# Patient Record
Sex: FEMALE | Race: WHITE | Employment: FULL TIME | URBAN - METROPOLITAN AREA
[De-identification: names, ages, dates, MRNs, and addresses within clinical notes are randomized per-mention and may not be internally consistent; named-entity substitution may affect disease eponyms.]

---

## 2018-02-08 ENCOUNTER — OFFICE VISIT (OUTPATIENT)
Dept: SURGERY | Age: 58
End: 2018-02-08

## 2018-02-08 ENCOUNTER — HOSPITAL ENCOUNTER (OUTPATIENT)
Dept: LAB | Age: 58
Discharge: HOME | End: 2018-02-08
Payer: COMMERCIAL

## 2018-02-08 VITALS
DIASTOLIC BLOOD PRESSURE: 84 MMHG | TEMPERATURE: 98.2 F | HEIGHT: 60 IN | RESPIRATION RATE: 17 BRPM | OXYGEN SATURATION: 96 % | WEIGHT: 192 LBS | HEART RATE: 78 BPM | SYSTOLIC BLOOD PRESSURE: 132 MMHG | BODY MASS INDEX: 37.69 KG/M2

## 2018-02-08 DIAGNOSIS — C18.7 MALIGNANT NEOPLASM OF SIGMOID COLON (HCC): ICD-10-CM

## 2018-02-08 DIAGNOSIS — C18.7 MALIGNANT NEOPLASM OF SIGMOID COLON (HCC): Primary | ICD-10-CM

## 2018-02-08 PROCEDURE — 93005 ELECTROCARDIOGRAM TRACING: CPT

## 2018-02-08 RX ORDER — METRONIDAZOLE 500 MG/1
500 TABLET ORAL 3 TIMES DAILY
Qty: 3 TAB | Refills: 0 | Status: SHIPPED | OUTPATIENT
Start: 2018-02-08 | End: 2018-02-09

## 2018-02-08 RX ORDER — NEOMYCIN SULFATE 500 MG/1
1000 TABLET ORAL 3 TIMES DAILY
Qty: 6 TAB | Refills: 0 | Status: SHIPPED | OUTPATIENT
Start: 2018-02-08 | End: 2018-02-09

## 2018-02-08 RX ORDER — DICLOFENAC SODIUM 75 MG/1
75 TABLET, DELAYED RELEASE ORAL AS NEEDED
COMMUNITY
Start: 2017-11-16

## 2018-02-08 RX ORDER — ROSUVASTATIN CALCIUM 10 MG/1
10 TABLET, COATED ORAL
COMMUNITY
Start: 2017-11-03

## 2018-02-08 RX ORDER — LANOLIN ALCOHOL/MO/W.PET/CERES
500 CREAM (GRAM) TOPICAL DAILY
COMMUNITY

## 2018-02-08 RX ORDER — GLUCOSAMINE SULFATE 1500 MG
2000 POWDER IN PACKET (EA) ORAL DAILY
COMMUNITY
End: 2018-02-23

## 2018-02-08 NOTE — MR AVS SNAPSHOT
[facility] [address] [facility] 
[phone] Patient: Robert Mendez MRN: [id number] Visit Information Date & Time Provider Department Dept. Phone Encounter #  
 2/8/2018  8:45 AM Aron Landon MD ROXANA RUSSELL [facility] [phone] [id number] Upcoming Health Maintenance Date Due Hepatitis C Screening 1960 DTaP/Tdap/Td series (1 - Tdap) 5/12/1980 PAP AKA CERVICAL CYTOLOGY 5/12/1980 BREAST CANCER SCRN MAMMOGRAM 5/12/2009 FOBT Q 1 YEAR AGE 50-75 5/12/2009 Influenza Age 5 to Adult 8/1/2017 Allergies as of 2/8/2018  Review Complete On: 2/8/2018 By: Aron Landon MD  
 No Known Allergies Current Immunizations  Never Reviewed No immunizations on file. Not reviewed this visit You Were Diagnosed With   
  
 Codes Comments Malignant neoplasm of sigmoid colon ([facility] [address]    -  Primary ICD-10-CM: C18.7 ICD-9-CM: 153.3 Vitals BP Pulse Temp Resp Height(growth percentile) Weight(growth percentile) 132/84 78 98.2 °F (36.8 °C) (Oral) 17 5' (1.524 m) 192 lb (87.1 kg) SpO2 BMI OB Status Smoking Status 96% 37.5 kg/m2 Menopause Never Smoker Vitals History BMI and BSA Data Body Mass Index Body Surface Area  
 37.5 kg/m 2 1.92 m 2 Your Updated Medication List  
  
   
This list is accurate as of: 2/8/18  9:20 AM.  Always use your most recent med list. amLODIPine-benazepril 10-20 mg per capsule Commonly known as:  Patty Oh Take 1 Cap by mouth daily. aspirin delayed-release 81 mg tablet Take 81 mg by mouth daily. diclofenac EC 75 mg EC tablet Commonly known as:  VOLTAREN  
75 mg.  
  
 escitalopram oxalate 10 mg tablet Commonly known as:  Addie Griffes Take 10 mg by mouth daily. fish oil-dha-epa 1,200-144-216 mg Cap Take  by mouth. hydroCHLOROthiazide 12.5 mg tablet Commonly known as:  HYDRODIURIL Take 12.5 mg by mouth daily. HYDROcodone-acetaminophen 5-325 mg per tablet Commonly known as:  Clemetine Omar Take 1 Tab by mouth every six (6) hours as needed for Pain. Max Daily Amount: 4 Tabs. ibuprofen 600 mg tablet Commonly known as:  MOTRIN Take 1 Tab by mouth every six (6) hours as needed for Pain. metroNIDAZOLE 500 mg tablet Commonly known as:  FLAGYL Take 1 Tab by mouth three (3) times daily for 1 day. Take at 1 pm, 2 pm and 11 pm the day prior to surgery  
  
 neomycin 500 mg tablet Commonly known as:  Kenyetta Cone Take 2 Tabs by mouth three (3) times daily for 3 doses. Take at 1 pm, 2 pm and 11 pm the day prior to surgery. rosuvastatin 10 mg tablet Commonly known as:  CRESTOR 10 mg.  
  
 simvastatin 20 mg tablet Commonly known as:  ZOCOR Take 20 mg by mouth nightly. SUPER B COMPLEX PO Take  by mouth. VITAMIN B-12 500 mcg tablet Generic drug:  cyanocobalamin Take 500 mcg by mouth daily. VITAMIN D3 1,000 unit Cap Generic drug:  cholecalciferol Take 2,000 Units by mouth daily. Prescriptions Printed Refills  
 metroNIDAZOLE (FLAGYL) 500 mg tablet 0 Sig: Take 1 Tab by mouth three (3) times daily for 1 day. Take at 1 pm, 2 pm and 11 pm the day prior to surgery Class: Print Route: Oral  
 neomycin (MYCIFRADIN) 500 mg tablet 0 Sig: Take 2 Tabs by mouth three (3) times daily for 3 doses. Take at 1 pm, 2 pm and 11 pm the day prior to surgery. Class: Print Route: Oral  
  
We Performed the Following SCHEDULE SURGERY [UBI5394 Custom] To-Do List   
 02/08/2018 Lab:  ALBUMIN   
  
 02/08/2018 Lab:  CBC WITH AUTOMATED DIFF   
  
 02/08/2018 Imaging:  CT CHEST ABD PELV W CONT   
  
 02/08/2018 ECG:  EKG, 12 LEAD, INITIAL   
  
 02/08/2018 Lab:  METABOLIC PANEL, BASIC   
  
 02/08/2018 Lab:  PROTHROMBIN TIME + INR   
  
 02/08/2018   Lab:  PTT   
  
 02/08/2018 Blood Bank:  TYPE & SCREEN Introducing hospitals & HEALTH SERVICES! Arpit Howard introduces [employer] [facility] patient portal. Now you can access parts of your medical record, email your doctor's office, and request medication refills online. 1. In your internet browser, go to [email or web address] 2. Click on the First Time User? Click Here link in the Sign In box. You will see the New Member Sign Up page. 3. Enter your [employer] [facility] Access Code exactly as it appears below. You will not need to use this code after youve completed the sign-up process. If you do not sign up before the expiration date, you must request a new code. · [employer] [facility] Access Code: [email or web address] Expires: 5/9/2018  8:17 AM 
 
4. Enter the last four digits of your Social Security Number (xxxx) and Date of Birth (mm/dd/yyyy) as indicated and click Submit. You will be taken to the next sign-up page. 5. Create a [employer] [facility] ID. This will be your [employer] [facility] login ID and cannot be changed, so think of one that is secure and easy to remember. 6. Create a [employer] [facility] password. You can change your password at any time. 7. Enter your Password Reset Question and Answer. This can be used at a later time if you forget your password. 8. Enter your e-mail address. You will receive e-mail notification when new information is available in [address] 9. Click Sign Up. You can now view and download portions of your medical record. 10. Click the Download Summary menu link to download a portable copy of your medical information. If you have questions, please visit the Frequently Asked Questions section of the [employer] [facility] website. Remember, [employer] [facility] is NOT to be used for urgent needs. For medical emergencies, dial 911. Now available from your iPhone and Android! Please provide this summary of care documentation to your next provider. Your primary care clinician is listed as Luis Clark.  If you have any questions after today's visit, please call [phone].

## 2018-02-08 NOTE — PROGRESS NOTES
Review of Systems   Constitutional: Positive for malaise/fatigue. Negative for chills, diaphoresis, fever and weight loss. HENT: Positive for ear pain. Negative for congestion, ear discharge, hearing loss, nosebleeds, sinus pain, sore throat and tinnitus. Eyes: Negative. Respiratory: Negative. Negative for stridor. Cardiovascular: Negative. Gastrointestinal: Positive for blood in stool, diarrhea and heartburn. Negative for abdominal pain, constipation, melena, nausea and vomiting. Genitourinary: Negative. Musculoskeletal: Positive for joint pain. Negative for back pain, falls, myalgias and neck pain. Skin: Negative. Neurological: Positive for tingling. Negative for dizziness, tremors, sensory change, speech change, focal weakness, seizures, loss of consciousness, weakness and headaches. Bilateral hands   Endo/Heme/Allergies: Negative for environmental allergies and polydipsia. Bruises/bleeds easily. Psychiatric/Behavioral: Negative for depression, hallucinations, memory loss, substance abuse and suicidal ideas. The patient is nervous/anxious. The patient does not have insomnia.

## 2018-02-08 NOTE — PROGRESS NOTES
HPI: Brown Prajapati is a 61 y.o. female presenting with chief complain of newly diagnosed descending colon cancer. Patient was having a colonoscopy. This was her first.  She noticed some blood per rectum over the last year. She did not have any changes in bowel habits. She denies nausea vomiting or abdominal pain. She denies recent unexplained weight loss. She has 5-6 bowel movements per day. They are loose and frequent. She denies a bowel regimen. She denies fecal incontinence or soilage. She denies urinary incontinence. She has no family medical history of colon rectal cancer. Past Medical History:   Diagnosis Date    Anxiety     Arthritis     psoriatic    High cholesterol     Hypertension     Sleep apnea     cpap       Past Surgical History:   Procedure Laterality Date    HX BACK SURGERY      HX COLONOSCOPY  01/29/2018     [facility] no history of colorectal cancer    Social History     Social History    Marital status:      Spouse name: N/A    Number of children: N/A    Years of education: N/A     Social History Main Topics    Smoking status: Never Smoker    Smokeless tobacco: Never Used    Alcohol use Yes      Comment: socially    Drug use: No    Sexual activity: Not Asked     Other Topics Concern    None     Social History Narrative       Review of Systems   Constitutional: Positive for malaise/fatigue. Negative for chills, diaphoresis, fever and weight loss. HENT: Positive for ear pain. Negative for congestion, ear discharge, hearing loss, nosebleeds, sinus pain, sore throat and tinnitus. Eyes: Negative. Respiratory: Negative. Negative for stridor. Cardiovascular: Negative. Gastrointestinal: Positive for blood in stool, diarrhea and heartburn. Negative for abdominal pain, constipation, melena, nausea and vomiting. Genitourinary: Negative. Musculoskeletal: Positive for joint pain. Negative for back pain, falls, myalgias and neck pain. Skin: Negative. Neurological: Positive for tingling. Negative for dizziness, tremors, sensory change, speech change, focal weakness, seizures, loss of consciousness, weakness and headaches. Bilateral hands   Endo/Heme/Allergies: Negative for environmental allergies and polydipsia. Bruises/bleeds easily. Psychiatric/Behavioral: Negative for depression, hallucinations, memory loss, substance abuse and suicidal ideas. The patient is nervous/anxious. The patient does not have insomnia. Outpatient Prescriptions Marked as Taking for the 2/8/18 encounter (Office Visit) with Vibha Rankin MD   Medication Sig Dispense Refill    rosuvastatin (CRESTOR) 10 mg tablet 10 mg.      diclofenac EC (VOLTAREN) 75 mg EC tablet 75 mg.  cholecalciferol (VITAMIN D3) 1,000 unit cap Take 2,000 Units by mouth daily.  cyanocobalamin (VITAMIN B-12) 500 mcg tablet Take 500 mcg by mouth daily.  metroNIDAZOLE (FLAGYL) 500 mg tablet Take 1 Tab by mouth three (3) times daily for 1 day. Take at 1 pm, 2 pm and 11 pm the day prior to surgery 3 Tab 0    neomycin (MYCIFRADIN) 500 mg tablet Take 2 Tabs by mouth three (3) times daily for 3 doses. Take at 1 pm, 2 pm and 11 pm the day prior to surgery. 6 Tab 0    fish oil-dha-epa 1,200-144-216 mg cap Take  by mouth.  FERROUS FUMARATE/VIT BCOMP,C (SUPER B COMPLEX PO) Take  by mouth.  amLODIPine-benazepril (LOTREL) 10-20 mg per capsule Take 1 Cap by mouth daily.  escitalopram oxalate (LEXAPRO) 10 mg tablet Take 10 mg by mouth daily.  Hydrochlorothiazide (HYDRODIURIL) 12.5 mg tablet Take 12.5 mg by mouth daily. No Known Allergies    Vitals:    02/08/18 0828   BP: 132/84   Pulse: 78   Resp: 17   Temp: 98.2 °F (36.8 °C)   TempSrc: Oral   SpO2: 96%   Weight: 87.1 kg (192 lb)   Height: 5' (1.524 m)   PainSc:   0 - No pain       Physical Exam   Constitutional: She appears well-developed and well-nourished. HENT:   Head: Normocephalic and atraumatic.    Eyes: Conjunctivae and EOM are normal.   Abdominal: Soft. She exhibits no distension. There is no tenderness. Musculoskeletal: Normal range of motion. Lymphadenopathy:     She has no cervical adenopathy. Right: No inguinal adenopathy present. Left: No inguinal adenopathy present. Neurological: She exhibits normal muscle tone. Skin: Skin is warm and dry. No rash noted. Psychiatric: She has a normal mood and affect. Her speech is normal.   Rectum: normal tone, no mass    Colonoscopy report is reviewed, patient had multiple polyps, particularly a 3 cm polyp in the rectum which was removed. One Endo Clip was placed. Additional polyps were in the ascending colon, transverse colon. The 5 cm malignant appearing mass was found in the distal descending colon. This was at 40 cm. Tattoos were applied proximally and distally. Assessment / Plan    Newly diagnosed sigmoid versus descending colon cancer  Check CTs and CEA  Schedule robotic sigmoid colectomy  Would be a good idea to flex sig at time of anastomosis to be sure it is not at point of rectal polypectomy site    The diagnoses and plan were discussed with the patient. All questions answered. Plan of care agreed to by all concerned.

## 2018-02-09 LAB
ATRIAL RATE: 68 BPM
CALCULATED P AXIS, ECG09: 59 DEGREES
CALCULATED R AXIS, ECG10: 5 DEGREES
CALCULATED T AXIS, ECG11: 26 DEGREES
DIAGNOSIS, 93000: NORMAL
P-R INTERVAL, ECG05: 162 MS
Q-T INTERVAL, ECG07: 426 MS
QRS DURATION, ECG06: 78 MS
QTC CALCULATION (BEZET), ECG08: 452 MS
VENTRICULAR RATE, ECG03: 68 BPM

## 2018-02-16 ENCOUNTER — HOSPITAL ENCOUNTER (OUTPATIENT)
Dept: PREADMISSION TESTING | Age: 58
Discharge: HOME | End: 2018-02-16
Payer: COMMERCIAL

## 2018-02-16 DIAGNOSIS — C18.7 MALIGNANT NEOPLASM OF SIGMOID COLON (HCC): ICD-10-CM

## 2018-02-16 LAB
ABO + RH BLD: NORMAL
ALBUMIN SERPL-MCNC: 3.8 G/DL (ref 3.4–5)
ANION GAP SERPL CALC-SCNC: 9 MMOL/L (ref 3–18)
APTT PPP: 28.8 SEC (ref 23–36.4)
BASOPHILS # BLD: 0 K/UL (ref 0–0.06)
BASOPHILS NFR BLD: 0 % (ref 0–2)
BLOOD GROUP ANTIBODIES SERPL: NORMAL
BUN SERPL-MCNC: 14 MG/DL (ref 7–18)
BUN/CREAT SERPL: 21 (ref 12–20)
CALCIUM SERPL-MCNC: 9.1 MG/DL (ref 8.5–10.1)
CHLORIDE SERPL-SCNC: 103 MMOL/L (ref 100–108)
CO2 SERPL-SCNC: 30 MMOL/L (ref 21–32)
CREAT SERPL-MCNC: 0.68 MG/DL (ref 0.6–1.3)
DIFFERENTIAL METHOD BLD: ABNORMAL
EOSINOPHIL # BLD: 0.1 K/UL (ref 0–0.4)
EOSINOPHIL NFR BLD: 2 % (ref 0–5)
ERYTHROCYTE [DISTWIDTH] IN BLOOD BY AUTOMATED COUNT: 14 % (ref 11.6–14.5)
GLUCOSE SERPL-MCNC: 169 MG/DL (ref 74–99)
HCT VFR BLD AUTO: 35.5 % (ref 35–45)
HGB BLD-MCNC: 11.8 G/DL (ref 12–16)
INR PPP: 0.9 (ref 0.8–1.2)
LYMPHOCYTES # BLD: 1.7 K/UL (ref 0.9–3.6)
LYMPHOCYTES NFR BLD: 26 % (ref 21–52)
MCH RBC QN AUTO: 27.5 PG (ref 24–34)
MCHC RBC AUTO-ENTMCNC: 33.2 G/DL (ref 31–37)
MCV RBC AUTO: 82.8 FL (ref 74–97)
MONOCYTES # BLD: 0.4 K/UL (ref 0.05–1.2)
MONOCYTES NFR BLD: 7 % (ref 3–10)
NEUTS SEG # BLD: 4.2 K/UL (ref 1.8–8)
NEUTS SEG NFR BLD: 65 % (ref 40–73)
PLATELET # BLD AUTO: 212 K/UL (ref 135–420)
PMV BLD AUTO: 11.1 FL (ref 9.2–11.8)
POTASSIUM SERPL-SCNC: 3.7 MMOL/L (ref 3.5–5.5)
PROTHROMBIN TIME: 11.9 SEC (ref 11.5–15.2)
RBC # BLD AUTO: 4.29 M/UL (ref 4.2–5.3)
SODIUM SERPL-SCNC: 142 MMOL/L (ref 136–145)
SPECIMEN EXP DATE BLD: NORMAL
WBC # BLD AUTO: 6.4 K/UL (ref 4.6–13.2)

## 2018-02-16 PROCEDURE — 82040 ASSAY OF SERUM ALBUMIN: CPT | Performed by: COLON & RECTAL SURGERY

## 2018-02-16 PROCEDURE — 36415 COLL VENOUS BLD VENIPUNCTURE: CPT | Performed by: COLON & RECTAL SURGERY

## 2018-02-16 PROCEDURE — 80048 BASIC METABOLIC PNL TOTAL CA: CPT | Performed by: COLON & RECTAL SURGERY

## 2018-02-16 PROCEDURE — 85730 THROMBOPLASTIN TIME PARTIAL: CPT | Performed by: COLON & RECTAL SURGERY

## 2018-02-16 PROCEDURE — 86900 BLOOD TYPING SEROLOGIC ABO: CPT | Performed by: COLON & RECTAL SURGERY

## 2018-02-16 PROCEDURE — 85610 PROTHROMBIN TIME: CPT | Performed by: COLON & RECTAL SURGERY

## 2018-02-16 PROCEDURE — 85025 COMPLETE CBC W/AUTO DIFF WBC: CPT | Performed by: COLON & RECTAL SURGERY

## 2018-02-20 ENCOUNTER — HOSPITAL ENCOUNTER (OUTPATIENT)
Dept: CT IMAGING | Age: 58
Discharge: HOME | End: 2018-02-20
Attending: COLON & RECTAL SURGERY
Payer: COMMERCIAL

## 2018-02-20 DIAGNOSIS — C18.7 MALIGNANT NEOPLASM OF SIGMOID COLON (HCC): ICD-10-CM

## 2018-02-20 PROCEDURE — 74011636320 HC RX REV CODE- 636/320: Performed by: COLON & RECTAL SURGERY

## 2018-02-20 PROCEDURE — 74177 CT ABD & PELVIS W/CONTRAST: CPT

## 2018-02-20 RX ADMIN — IOPAMIDOL 100 ML: 612 INJECTION, SOLUTION INTRAVENOUS at 08:31

## 2018-02-21 NOTE — PROGRESS NOTES
Liver lesions look cystic but will obtain MRI after recovery from surgery. Will also look at liver intraop and biopsy if anything suspicious.

## 2018-02-23 RX ORDER — CHOLECALCIFEROL (VITAMIN D3) 125 MCG
CAPSULE ORAL
COMMUNITY
End: 2019-12-23 | Stop reason: SDUPTHER

## 2018-02-26 ENCOUNTER — ANESTHESIA EVENT (OUTPATIENT)
Dept: SURGERY | Age: 58
DRG: 331 | End: 2018-02-26
Payer: COMMERCIAL

## 2018-02-27 ENCOUNTER — HOSPITAL ENCOUNTER (INPATIENT)
Age: 58
LOS: 4 days | Discharge: HOME | DRG: 331 | End: 2018-03-03
Attending: COLON & RECTAL SURGERY | Admitting: COLON & RECTAL SURGERY
Payer: COMMERCIAL

## 2018-02-27 ENCOUNTER — ANESTHESIA (OUTPATIENT)
Dept: SURGERY | Age: 58
DRG: 331 | End: 2018-02-27
Payer: COMMERCIAL

## 2018-02-27 DIAGNOSIS — C18.7 MALIGNANT NEOPLASM OF SIGMOID COLON (HCC): Primary | ICD-10-CM

## 2018-02-27 PROBLEM — C18.9 COLON CANCER (HCC): Status: ACTIVE | Noted: 2018-02-27

## 2018-02-27 PROCEDURE — 76210000006 HC OR PH I REC 0.5 TO 1 HR: Performed by: COLON & RECTAL SURGERY

## 2018-02-27 PROCEDURE — 74011000250 HC RX REV CODE- 250: Performed by: COLON & RECTAL SURGERY

## 2018-02-27 PROCEDURE — 77030035277 HC OBTRTR BLDELSS DISP INTU -B: Performed by: COLON & RECTAL SURGERY

## 2018-02-27 PROCEDURE — 77030019605: Performed by: COLON & RECTAL SURGERY

## 2018-02-27 PROCEDURE — 77030011296 HC FCPS GRSP ENDOSC J&J -B: Performed by: COLON & RECTAL SURGERY

## 2018-02-27 PROCEDURE — 0DBN4ZZ EXCISION OF SIGMOID COLON, PERCUTANEOUS ENDOSCOPIC APPROACH: ICD-10-PCS | Performed by: COLON & RECTAL SURGERY

## 2018-02-27 PROCEDURE — 77030008683 HC TU ET CUF COVD -A: Performed by: ANESTHESIOLOGY

## 2018-02-27 PROCEDURE — 77030008544 HC TBNG MON PRSS MRTM -B: Performed by: ANESTHESIOLOGY

## 2018-02-27 PROCEDURE — 77030031139 HC SUT VCRL2 J&J -A: Performed by: COLON & RECTAL SURGERY

## 2018-02-27 PROCEDURE — 74011250636 HC RX REV CODE- 250/636: Performed by: COLON & RECTAL SURGERY

## 2018-02-27 PROCEDURE — 77030008771 HC TU NG SALEM SUMP -A: Performed by: ANESTHESIOLOGY

## 2018-02-27 PROCEDURE — 81301 MICROSATELLITE INSTABILITY: CPT | Performed by: COLON & RECTAL SURGERY

## 2018-02-27 PROCEDURE — 77030025303 HC STPLR ENDOSC J&J -G: Performed by: COLON & RECTAL SURGERY

## 2018-02-27 PROCEDURE — 74011000250 HC RX REV CODE- 250

## 2018-02-27 PROCEDURE — 74011000254 HC RX REV CODE- 254

## 2018-02-27 PROCEDURE — 77030034850: Performed by: COLON & RECTAL SURGERY

## 2018-02-27 PROCEDURE — 65270000029 HC RM PRIVATE

## 2018-02-27 PROCEDURE — 77030027138 HC INCENT SPIROMETER -A

## 2018-02-27 PROCEDURE — 77030010030: Performed by: COLON & RECTAL SURGERY

## 2018-02-27 PROCEDURE — 88305 TISSUE EXAM BY PATHOLOGIST: CPT | Performed by: COLON & RECTAL SURGERY

## 2018-02-27 PROCEDURE — 74011250636 HC RX REV CODE- 250/636

## 2018-02-27 PROCEDURE — 77030010285 HC STPLR INT PRSTRNG COVD -B: Performed by: COLON & RECTAL SURGERY

## 2018-02-27 PROCEDURE — 76010000883 HC OR TIME 5.5 TO 6HR INTENSV - TIER 2: Performed by: COLON & RECTAL SURGERY

## 2018-02-27 PROCEDURE — 77030032490 HC SLV COMPR SCD KNE COVD -B: Performed by: COLON & RECTAL SURGERY

## 2018-02-27 PROCEDURE — 8E0W4CZ ROBOTIC ASSISTED PROCEDURE OF TRUNK REGION, PERCUTANEOUS ENDOSCOPIC APPROACH: ICD-10-PCS | Performed by: COLON & RECTAL SURGERY

## 2018-02-27 PROCEDURE — 74011250636 HC RX REV CODE- 250/636: Performed by: NURSE ANESTHETIST, CERTIFIED REGISTERED

## 2018-02-27 PROCEDURE — 74011000272 HC RX REV CODE- 272: Performed by: COLON & RECTAL SURGERY

## 2018-02-27 PROCEDURE — 77030020703 HC SEAL CANN DISP INTU -B: Performed by: COLON & RECTAL SURGERY

## 2018-02-27 PROCEDURE — 77030003580 HC NDL INSUF VERES J&J -B: Performed by: COLON & RECTAL SURGERY

## 2018-02-27 PROCEDURE — 77030028754 HC RCTRCTR LAPSCP ALX DSP AMR -B: Performed by: COLON & RECTAL SURGERY

## 2018-02-27 PROCEDURE — 77030035279 HC SEAL VSL ENDOWR XI INTU -I2: Performed by: COLON & RECTAL SURGERY

## 2018-02-27 PROCEDURE — 77030018836 HC SOL IRR NACL ICUM -A: Performed by: COLON & RECTAL SURGERY

## 2018-02-27 PROCEDURE — 77030032522 HC SHT STPL PK ENDOWR INTU -B: Performed by: COLON & RECTAL SURGERY

## 2018-02-27 PROCEDURE — 77030013079 HC BLNKT BAIR HGGR 3M -A: Performed by: ANESTHESIOLOGY

## 2018-02-27 PROCEDURE — 77030032523 HC RELD STPL PK ENDORS INTU -C: Performed by: COLON & RECTAL SURGERY

## 2018-02-27 PROCEDURE — 77030026438 HC STYL ET INTUB CARD -A: Performed by: ANESTHESIOLOGY

## 2018-02-27 PROCEDURE — 74011000250 HC RX REV CODE- 250: Performed by: NURSE ANESTHETIST, CERTIFIED REGISTERED

## 2018-02-27 PROCEDURE — 77030002933 HC SUT MCRYL J&J -A: Performed by: COLON & RECTAL SURGERY

## 2018-02-27 PROCEDURE — 77030002966 HC SUT PDS J&J -A: Performed by: COLON & RECTAL SURGERY

## 2018-02-27 PROCEDURE — 77030035489 HC REDUCR CANN ENDOWR INTU -C: Performed by: COLON & RECTAL SURGERY

## 2018-02-27 PROCEDURE — 77030011640 HC PAD GRND REM COVD -A: Performed by: COLON & RECTAL SURGERY

## 2018-02-27 PROCEDURE — 77030035278 HC STPLR SEAL ENDOWR INTU -B: Performed by: COLON & RECTAL SURGERY

## 2018-02-27 PROCEDURE — 88309 TISSUE EXAM BY PATHOLOGIST: CPT | Performed by: COLON & RECTAL SURGERY

## 2018-02-27 PROCEDURE — 74011000258 HC RX REV CODE- 258: Performed by: COLON & RECTAL SURGERY

## 2018-02-27 PROCEDURE — 77030038612 HC TU SUCT/IRR INTU -D: Performed by: COLON & RECTAL SURGERY

## 2018-02-27 PROCEDURE — 77030002986 HC SUT PROL J&J -A: Performed by: COLON & RECTAL SURGERY

## 2018-02-27 PROCEDURE — 76060000042 HC ANESTHESIA 5.5 TO 6 HR: Performed by: COLON & RECTAL SURGERY

## 2018-02-27 PROCEDURE — 0DNW4ZZ RELEASE PERITONEUM, PERCUTANEOUS ENDOSCOPIC APPROACH: ICD-10-PCS | Performed by: COLON & RECTAL SURGERY

## 2018-02-27 PROCEDURE — 77030003028 HC SUT VCRL J&J -A: Performed by: COLON & RECTAL SURGERY

## 2018-02-27 RX ORDER — NALOXONE HYDROCHLORIDE 0.4 MG/ML
0.1 INJECTION, SOLUTION INTRAMUSCULAR; INTRAVENOUS; SUBCUTANEOUS ONCE
Status: DISCONTINUED | OUTPATIENT
Start: 2018-02-27 | End: 2018-02-27 | Stop reason: HOSPADM

## 2018-02-27 RX ORDER — EPHEDRINE SULFATE 50 MG/ML
INJECTION, SOLUTION INTRAVENOUS AS NEEDED
Status: DISCONTINUED | OUTPATIENT
Start: 2018-02-27 | End: 2018-02-27 | Stop reason: HOSPADM

## 2018-02-27 RX ORDER — FENTANYL CITRATE 50 UG/ML
INJECTION, SOLUTION INTRAMUSCULAR; INTRAVENOUS AS NEEDED
Status: DISCONTINUED | OUTPATIENT
Start: 2018-02-27 | End: 2018-02-27 | Stop reason: HOSPADM

## 2018-02-27 RX ORDER — SODIUM CHLORIDE, SODIUM LACTATE, POTASSIUM CHLORIDE, CALCIUM CHLORIDE 600; 310; 30; 20 MG/100ML; MG/100ML; MG/100ML; MG/100ML
75 INJECTION, SOLUTION INTRAVENOUS CONTINUOUS
Status: DISCONTINUED | OUTPATIENT
Start: 2018-02-27 | End: 2018-02-27 | Stop reason: HOSPADM

## 2018-02-27 RX ORDER — INDOCYANINE GREEN AND WATER 25 MG
KIT INJECTION AS NEEDED
Status: DISCONTINUED | OUTPATIENT
Start: 2018-02-27 | End: 2018-02-27 | Stop reason: HOSPADM

## 2018-02-27 RX ORDER — NEOSTIGMINE METHYLSULFATE 5 MG/5 ML
SYRINGE (ML) INTRAVENOUS AS NEEDED
Status: DISCONTINUED | OUTPATIENT
Start: 2018-02-27 | End: 2018-02-27 | Stop reason: HOSPADM

## 2018-02-27 RX ORDER — SODIUM CHLORIDE 0.9 % (FLUSH) 0.9 %
5-10 SYRINGE (ML) INJECTION AS NEEDED
Status: DISCONTINUED | OUTPATIENT
Start: 2018-02-27 | End: 2018-02-27 | Stop reason: HOSPADM

## 2018-02-27 RX ORDER — HEPARIN SODIUM 5000 [USP'U]/ML
5000 INJECTION, SOLUTION INTRAVENOUS; SUBCUTANEOUS EVERY 8 HOURS
Status: DISCONTINUED | OUTPATIENT
Start: 2018-02-28 | End: 2018-03-03 | Stop reason: HOSPADM

## 2018-02-27 RX ORDER — ROSUVASTATIN CALCIUM 10 MG/1
10 TABLET, COATED ORAL
Status: DISCONTINUED | OUTPATIENT
Start: 2018-02-28 | End: 2018-03-03 | Stop reason: HOSPADM

## 2018-02-27 RX ORDER — MIDAZOLAM HYDROCHLORIDE 1 MG/ML
INJECTION, SOLUTION INTRAMUSCULAR; INTRAVENOUS AS NEEDED
Status: DISCONTINUED | OUTPATIENT
Start: 2018-02-27 | End: 2018-02-27 | Stop reason: HOSPADM

## 2018-02-27 RX ORDER — SODIUM CHLORIDE, SODIUM LACTATE, POTASSIUM CHLORIDE, CALCIUM CHLORIDE 600; 310; 30; 20 MG/100ML; MG/100ML; MG/100ML; MG/100ML
INJECTION, SOLUTION INTRAVENOUS
Status: DISCONTINUED | OUTPATIENT
Start: 2018-02-27 | End: 2018-02-27 | Stop reason: HOSPADM

## 2018-02-27 RX ORDER — HYDROMORPHONE HYDROCHLORIDE 2 MG/ML
INJECTION, SOLUTION INTRAMUSCULAR; INTRAVENOUS; SUBCUTANEOUS AS NEEDED
Status: DISCONTINUED | OUTPATIENT
Start: 2018-02-27 | End: 2018-02-27 | Stop reason: HOSPADM

## 2018-02-27 RX ORDER — ROCURONIUM BROMIDE 10 MG/ML
INJECTION, SOLUTION INTRAVENOUS AS NEEDED
Status: DISCONTINUED | OUTPATIENT
Start: 2018-02-27 | End: 2018-02-27 | Stop reason: HOSPADM

## 2018-02-27 RX ORDER — SODIUM CHLORIDE 0.9 % (FLUSH) 0.9 %
5-10 SYRINGE (ML) INJECTION EVERY 8 HOURS
Status: DISCONTINUED | OUTPATIENT
Start: 2018-02-27 | End: 2018-02-27 | Stop reason: HOSPADM

## 2018-02-27 RX ORDER — DEXAMETHASONE SODIUM PHOSPHATE 4 MG/ML
INJECTION, SOLUTION INTRA-ARTICULAR; INTRALESIONAL; INTRAMUSCULAR; INTRAVENOUS; SOFT TISSUE AS NEEDED
Status: DISCONTINUED | OUTPATIENT
Start: 2018-02-27 | End: 2018-02-27 | Stop reason: HOSPADM

## 2018-02-27 RX ORDER — ONDANSETRON 2 MG/ML
4 INJECTION INTRAMUSCULAR; INTRAVENOUS
Status: DISCONTINUED | OUTPATIENT
Start: 2018-02-27 | End: 2018-03-03 | Stop reason: HOSPADM

## 2018-02-27 RX ORDER — SODIUM CHLORIDE, SODIUM LACTATE, POTASSIUM CHLORIDE, CALCIUM CHLORIDE 600; 310; 30; 20 MG/100ML; MG/100ML; MG/100ML; MG/100ML
125 INJECTION, SOLUTION INTRAVENOUS CONTINUOUS
Status: DISCONTINUED | OUTPATIENT
Start: 2018-02-27 | End: 2018-03-01

## 2018-02-27 RX ORDER — HYDROMORPHONE HYDROCHLORIDE 1 MG/ML
0.5 INJECTION, SOLUTION INTRAMUSCULAR; INTRAVENOUS; SUBCUTANEOUS
Status: DISCONTINUED | OUTPATIENT
Start: 2018-02-27 | End: 2018-03-03 | Stop reason: HOSPADM

## 2018-02-27 RX ORDER — METRONIDAZOLE 500 MG/100ML
500 INJECTION, SOLUTION INTRAVENOUS EVERY 8 HOURS
Status: DISCONTINUED | OUTPATIENT
Start: 2018-02-27 | End: 2018-02-27 | Stop reason: DRUGHIGH

## 2018-02-27 RX ORDER — SUCCINYLCHOLINE CHLORIDE 20 MG/ML
INJECTION INTRAMUSCULAR; INTRAVENOUS AS NEEDED
Status: DISCONTINUED | OUTPATIENT
Start: 2018-02-27 | End: 2018-02-27 | Stop reason: HOSPADM

## 2018-02-27 RX ORDER — METRONIDAZOLE 500 MG/100ML
500 INJECTION, SOLUTION INTRAVENOUS EVERY 12 HOURS
Status: COMPLETED | OUTPATIENT
Start: 2018-02-27 | End: 2018-02-28

## 2018-02-27 RX ORDER — CEFAZOLIN SODIUM 2 G/50ML
2 SOLUTION INTRAVENOUS EVERY 8 HOURS
Status: COMPLETED | OUTPATIENT
Start: 2018-02-27 | End: 2018-02-28

## 2018-02-27 RX ORDER — METRONIDAZOLE 500 MG/100ML
500 INJECTION, SOLUTION INTRAVENOUS
Status: COMPLETED | OUTPATIENT
Start: 2018-02-27 | End: 2018-02-27

## 2018-02-27 RX ORDER — DIPHENHYDRAMINE HYDROCHLORIDE 50 MG/ML
25 INJECTION, SOLUTION INTRAMUSCULAR; INTRAVENOUS
Status: DISCONTINUED | OUTPATIENT
Start: 2018-02-27 | End: 2018-03-03 | Stop reason: HOSPADM

## 2018-02-27 RX ORDER — SODIUM CHLORIDE 9 MG/ML
500 INJECTION, SOLUTION INTRAVENOUS ONCE
Status: COMPLETED | OUTPATIENT
Start: 2018-02-27 | End: 2018-02-27

## 2018-02-27 RX ORDER — ESCITALOPRAM OXALATE 10 MG/1
10 TABLET ORAL DAILY
Status: DISCONTINUED | OUTPATIENT
Start: 2018-02-28 | End: 2018-03-03 | Stop reason: HOSPADM

## 2018-02-27 RX ORDER — AMLODIPINE BESYLATE 10 MG/1
10 TABLET ORAL DAILY
Status: DISCONTINUED | OUTPATIENT
Start: 2018-02-28 | End: 2018-03-03 | Stop reason: HOSPADM

## 2018-02-27 RX ORDER — ONDANSETRON 2 MG/ML
INJECTION INTRAMUSCULAR; INTRAVENOUS AS NEEDED
Status: DISCONTINUED | OUTPATIENT
Start: 2018-02-27 | End: 2018-02-27 | Stop reason: HOSPADM

## 2018-02-27 RX ORDER — PROPOFOL 10 MG/ML
INJECTION, EMULSION INTRAVENOUS AS NEEDED
Status: DISCONTINUED | OUTPATIENT
Start: 2018-02-27 | End: 2018-02-27 | Stop reason: HOSPADM

## 2018-02-27 RX ORDER — GLYCOPYRROLATE 0.2 MG/ML
INJECTION INTRAMUSCULAR; INTRAVENOUS AS NEEDED
Status: DISCONTINUED | OUTPATIENT
Start: 2018-02-27 | End: 2018-02-27 | Stop reason: HOSPADM

## 2018-02-27 RX ORDER — HYDROMORPHONE HYDROCHLORIDE 1 MG/ML
0.5 INJECTION, SOLUTION INTRAMUSCULAR; INTRAVENOUS; SUBCUTANEOUS AS NEEDED
Status: DISCONTINUED | OUTPATIENT
Start: 2018-02-27 | End: 2018-02-27 | Stop reason: HOSPADM

## 2018-02-27 RX ORDER — LIDOCAINE HYDROCHLORIDE 20 MG/ML
INJECTION, SOLUTION EPIDURAL; INFILTRATION; INTRACAUDAL; PERINEURAL AS NEEDED
Status: DISCONTINUED | OUTPATIENT
Start: 2018-02-27 | End: 2018-02-27 | Stop reason: HOSPADM

## 2018-02-27 RX ORDER — CEFAZOLIN SODIUM 2 G/50ML
2 SOLUTION INTRAVENOUS
Status: COMPLETED | OUTPATIENT
Start: 2018-02-27 | End: 2018-02-27

## 2018-02-27 RX ORDER — ACETAMINOPHEN 325 MG/1
650 TABLET ORAL
Status: DISCONTINUED | OUTPATIENT
Start: 2018-02-27 | End: 2018-03-03 | Stop reason: HOSPADM

## 2018-02-27 RX ORDER — HYDROMORPHONE HYDROCHLORIDE 2 MG/ML
0.5 INJECTION, SOLUTION INTRAMUSCULAR; INTRAVENOUS; SUBCUTANEOUS AS NEEDED
Status: DISCONTINUED | OUTPATIENT
Start: 2018-02-27 | End: 2018-02-27

## 2018-02-27 RX ADMIN — SODIUM CHLORIDE, SODIUM LACTATE, POTASSIUM CHLORIDE, CALCIUM CHLORIDE: 600; 310; 30; 20 INJECTION, SOLUTION INTRAVENOUS at 11:15

## 2018-02-27 RX ADMIN — GLYCOPYRROLATE 0.6 MG: 0.2 INJECTION INTRAMUSCULAR; INTRAVENOUS at 12:43

## 2018-02-27 RX ADMIN — HYDROMORPHONE HYDROCHLORIDE 0.5 MG: 2 INJECTION, SOLUTION INTRAMUSCULAR; INTRAVENOUS; SUBCUTANEOUS at 13:00

## 2018-02-27 RX ADMIN — ONDANSETRON 4 MG: 2 INJECTION INTRAMUSCULAR; INTRAVENOUS at 12:28

## 2018-02-27 RX ADMIN — CEFAZOLIN SODIUM 2 G: 2 SOLUTION INTRAVENOUS at 16:15

## 2018-02-27 RX ADMIN — SODIUM CHLORIDE, SODIUM LACTATE, POTASSIUM CHLORIDE, AND CALCIUM CHLORIDE: 600; 310; 30; 20 INJECTION, SOLUTION INTRAVENOUS at 10:45

## 2018-02-27 RX ADMIN — ROCURONIUM BROMIDE 5 MG: 10 INJECTION, SOLUTION INTRAVENOUS at 07:35

## 2018-02-27 RX ADMIN — EPHEDRINE SULFATE 10 MG: 50 INJECTION, SOLUTION INTRAVENOUS at 10:55

## 2018-02-27 RX ADMIN — FENTANYL CITRATE 100 MCG: 50 INJECTION, SOLUTION INTRAMUSCULAR; INTRAVENOUS at 08:00

## 2018-02-27 RX ADMIN — HYDROMORPHONE HYDROCHLORIDE 0.5 MG: 1 INJECTION, SOLUTION INTRAMUSCULAR; INTRAVENOUS; SUBCUTANEOUS at 22:35

## 2018-02-27 RX ADMIN — ROCURONIUM BROMIDE 20 MG: 10 INJECTION, SOLUTION INTRAVENOUS at 10:11

## 2018-02-27 RX ADMIN — DEXAMETHASONE SODIUM PHOSPHATE 4 MG: 4 INJECTION, SOLUTION INTRA-ARTICULAR; INTRALESIONAL; INTRAMUSCULAR; INTRAVENOUS; SOFT TISSUE at 08:15

## 2018-02-27 RX ADMIN — SUCCINYLCHOLINE CHLORIDE 120 MG: 20 INJECTION INTRAMUSCULAR; INTRAVENOUS at 07:35

## 2018-02-27 RX ADMIN — EPHEDRINE SULFATE 10 MG: 50 INJECTION, SOLUTION INTRAVENOUS at 08:08

## 2018-02-27 RX ADMIN — SODIUM CHLORIDE, SODIUM LACTATE, POTASSIUM CHLORIDE, AND CALCIUM CHLORIDE 125 ML/HR: 600; 310; 30; 20 INJECTION, SOLUTION INTRAVENOUS at 20:54

## 2018-02-27 RX ADMIN — MIDAZOLAM HYDROCHLORIDE 2 MG: 1 INJECTION, SOLUTION INTRAMUSCULAR; INTRAVENOUS at 07:26

## 2018-02-27 RX ADMIN — SODIUM CHLORIDE 500 ML: 900 INJECTION, SOLUTION INTRAVENOUS at 06:53

## 2018-02-27 RX ADMIN — METRONIDAZOLE 500 MG: 500 INJECTION, SOLUTION INTRAVENOUS at 20:54

## 2018-02-27 RX ADMIN — ROCURONIUM BROMIDE 20 MG: 10 INJECTION, SOLUTION INTRAVENOUS at 08:45

## 2018-02-27 RX ADMIN — CEFAZOLIN SODIUM 2 G: 2 SOLUTION INTRAVENOUS at 07:38

## 2018-02-27 RX ADMIN — INDOCYANINE GREEN AND WATER 3 MG: KIT at 10:35

## 2018-02-27 RX ADMIN — ROCURONIUM BROMIDE 35 MG: 10 INJECTION, SOLUTION INTRAVENOUS at 07:50

## 2018-02-27 RX ADMIN — FENTANYL CITRATE 150 MCG: 50 INJECTION, SOLUTION INTRAMUSCULAR; INTRAVENOUS at 07:35

## 2018-02-27 RX ADMIN — EPHEDRINE SULFATE 5 MG: 50 INJECTION, SOLUTION INTRAVENOUS at 08:30

## 2018-02-27 RX ADMIN — METRONIDAZOLE 500 MG: 500 SOLUTION INTRAVENOUS at 07:55

## 2018-02-27 RX ADMIN — PROMETHAZINE HYDROCHLORIDE 12.5 MG: 25 INJECTION INTRAMUSCULAR; INTRAVENOUS at 15:15

## 2018-02-27 RX ADMIN — SODIUM CHLORIDE, SODIUM LACTATE, POTASSIUM CHLORIDE, AND CALCIUM CHLORIDE 75 ML/HR: 600; 310; 30; 20 INJECTION, SOLUTION INTRAVENOUS at 06:54

## 2018-02-27 RX ADMIN — Medication 10 ML: at 06:56

## 2018-02-27 RX ADMIN — SODIUM CHLORIDE, SODIUM LACTATE, POTASSIUM CHLORIDE, CALCIUM CHLORIDE: 600; 310; 30; 20 INJECTION, SOLUTION INTRAVENOUS at 08:10

## 2018-02-27 RX ADMIN — HYDROMORPHONE HYDROCHLORIDE 0.5 MG: 1 INJECTION, SOLUTION INTRAMUSCULAR; INTRAVENOUS; SUBCUTANEOUS at 18:54

## 2018-02-27 RX ADMIN — PROPOFOL 150 MG: 10 INJECTION, EMULSION INTRAVENOUS at 07:35

## 2018-02-27 RX ADMIN — EPHEDRINE SULFATE 10 MG: 50 INJECTION, SOLUTION INTRAVENOUS at 08:07

## 2018-02-27 RX ADMIN — Medication 3 MG: at 12:43

## 2018-02-27 RX ADMIN — FAMOTIDINE 20 MG: 10 INJECTION INTRAVENOUS at 06:53

## 2018-02-27 RX ADMIN — SODIUM CHLORIDE, SODIUM LACTATE, POTASSIUM CHLORIDE, AND CALCIUM CHLORIDE 125 ML/HR: 600; 310; 30; 20 INJECTION, SOLUTION INTRAVENOUS at 15:19

## 2018-02-27 RX ADMIN — SODIUM CHLORIDE, SODIUM LACTATE, POTASSIUM CHLORIDE, AND CALCIUM CHLORIDE: 600; 310; 30; 20 INJECTION, SOLUTION INTRAVENOUS at 08:15

## 2018-02-27 RX ADMIN — EPHEDRINE SULFATE 5 MG: 50 INJECTION, SOLUTION INTRAVENOUS at 10:59

## 2018-02-27 RX ADMIN — INDOCYANINE GREEN AND WATER 3 MG: KIT at 11:06

## 2018-02-27 RX ADMIN — LIDOCAINE HYDROCHLORIDE 40 MG: 20 INJECTION, SOLUTION EPIDURAL; INFILTRATION; INTRACAUDAL; PERINEURAL at 07:35

## 2018-02-27 RX ADMIN — ROCURONIUM BROMIDE 20 MG: 10 INJECTION, SOLUTION INTRAVENOUS at 11:45

## 2018-02-27 RX ADMIN — FAMOTIDINE 20 MG: 10 INJECTION, SOLUTION INTRAVENOUS at 21:00

## 2018-02-27 RX ADMIN — HYDROMORPHONE HYDROCHLORIDE 0.5 MG: 2 INJECTION, SOLUTION INTRAMUSCULAR; INTRAVENOUS; SUBCUTANEOUS at 12:22

## 2018-02-27 NOTE — PERIOP NOTES
Patient having C/O pain to right forearm, informed by O.R. Staff that the arm was tucked hard, but does not appear to be any physical damage on observation of right arm, ice applied.

## 2018-02-27 NOTE — INTERVAL H&P NOTE
H&P Update:  Codie Tena was seen and examined. History and physical has been reviewed. The patient has been examined.  There have been no significant clinical changes since the completion of the originally dated History and Physical.    Signed By: Kira Shen MD     February 27, 2018 7:10 AM

## 2018-02-27 NOTE — PERIOP NOTES
TRANSFER - OUT REPORT:    Verbal report given to Diana RN on Michael Mayo  being transferred to  for routine post - op       Report consisted of patients Situation, Background, Assessment and   Recommendations(SBAR). Information from the following report(s) SBAR and MAR was reviewed with the receiving nurse. Lines:   Peripheral IV 02/27/18 Right Wrist (Active)   Site Assessment Clean, dry, & intact 2/27/2018  6:51 AM   Phlebitis Assessment 0 2/27/2018  6:51 AM   Infiltration Assessment 0 2/27/2018  6:51 AM   Dressing Status New 2/27/2018  6:51 AM   Dressing Type Tape;Transparent 2/27/2018  6:51 AM   Hub Color/Line Status Pink; Infusing;Flushed 2/27/2018  6:51 AM   Alcohol Cap Used Yes 2/27/2018  6:51 AM       Peripheral IV 02/27/18 Left Wrist (Active)   Site Assessment Clean, dry, & intact 2/27/2018  6:58 AM   Phlebitis Assessment 0 2/27/2018  6:58 AM   Infiltration Assessment 0 2/27/2018  6:58 AM   Dressing Status New 2/27/2018  6:58 AM   Dressing Type Tape;Transparent 2/27/2018  6:58 AM   Hub Color/Line Status Pink; Infusing;Flushed 2/27/2018  6:58 AM   Alcohol Cap Used Yes 2/27/2018  6:58 AM        Opportunity for questions and clarification was provided.       Patient transported with:   O2 @ 2 liters

## 2018-02-27 NOTE — ANESTHESIA PREPROCEDURE EVALUATION
Anesthetic History   No history of anesthetic complications            Review of Systems / Medical History  Patient summary reviewed and pertinent labs reviewed    Pulmonary        Sleep apnea: CPAP           Neuro/Psych   Within defined limits           Cardiovascular    Hypertension: well controlled              Exercise tolerance: >4 METS     GI/Hepatic/Renal  Within defined limits              Endo/Other        Obesity and arthritis     Other Findings   Comments: Documentation of current medication  Current medications obtained, documented and obtained? YES      Risk Factors for Postoperative nausea/vomiting:       History of postoperative nausea/vomiting? NO       Female? YES       Motion sickness? NO       Intended opioid administration for postoperative analgesia? YES      Smoking Abstinence:  Current Smoker? NO  Elective Surgery? YES  Seen preoperatively by anesthesiologist or proxy prior to day of surgery? YES  Pt abstained from smoking 24 hours prior to anesthesia?  YES    Preventive care/screening for High Blood Pressure:  Aged 18 years and older: YES  Screened for high blood pressure: YES  Patients with high blood pressure referred to primary care provider   for BP management: YES                 Physical Exam    Airway  Mallampati: II    Neck ROM: normal range of motion   Mouth opening: Normal     Cardiovascular  Regular rate and rhythm,  S1 and S2 normal,  no murmur, click, rub, or gallop             Dental    Dentition: Full upper dentures and Full lower dentures     Pulmonary  Breath sounds clear to auscultation               Abdominal  GI exam deferred       Other Findings            Anesthetic Plan    ASA: 2  Anesthesia type: general          Induction: Intravenous  Anesthetic plan and risks discussed with: Patient

## 2018-02-27 NOTE — PROGRESS NOTES
Patients arrives via bed from PACU alert awake and oriented, area to abdomen with 5 laps site dry and intact with a steri  Strips. No s/s of distress or sob.

## 2018-02-27 NOTE — OP NOTES
[address] [place] [address]   OPERATIVE REPORT    Elisha Marin  MR#: [id number]  : 1960  ACCOUNT #: [de-identified]   DATE OF SERVICE: 2018    PREOPERATIVE DIAGNOSIS:  Adenocarcinoma of the sigmoid colon. POSTOPERATIVE DIAGNOSIS:  Adenocarcinoma of the sigmoid colon. PROCEDURES PERFORMED:  Robotic sigmoid colectomy, splenic flexure mobilization. SURGEON:  Josue Faria MD     ANESTHESIA:  General.    ESTIMATED BLOOD LOSS:  75 mL. SPECIMEN REMOVED:  Sigmoid colon to Pathology. COMPLICATIONS:  None. IMPLANTS:  none     INDICATIONS:  The patient is a 58-year-old woman with a newly diagnosed adenocarcinoma of the sigmoid colon. She is brought to the operating room for surgical excision. I explained the risks to the patient including bleeding, infection, injury to surrounding structures, need for a temporary permanent stoma, and death. She understood and wished to proceed. PROCEDURE:  The patient was properly identified in the holding area, brought to the operating room. She was laid supine on the operating room table. General anesthesia was administered. Tony catheter was placed. Arms were tucked at her side. Chest was strapped to the bed. Legs were placed in a split leg position. Abdomen and perineal areas were prepped and draped in the usual sterile fashion. We inserted a Veress needle beneath the left costal margin and insufflated the abdomen to 15 mmHg, placed an 8 mm robotic port in the upper midline and 3 additional ports down the patient's right abdomen under robotic camera guidance. There were significant omental adhesive disease throughout the abdominal cavity and we spent approximately 30 minutes lysing adhesions with EndoShears. Once this was complete, the patient was placed in Trendelenburg position with right side down and the robot was docked.       We scored the retroperitoneum along the aorta and the upper abdomen, started a medial to lateral dissection. We identified the ureter as well as the inferior mesenteric artery and inferior mesenteric artery was circumferentially dissected and transected with the vessel sealing device. We then completed the medial to lateral resection of the descending and sigmoid colon. We next took the lateral attachments with a robotic hook to complete the mobilization of the sigmoid and descending colon and brought this down to the upper rectum as well. We transected the mesorectum with the vessel-sealing device and came across the proximal rectum with two firings of a robotic 45 mm blue load Endo-PALMA stapler. It should be noted that prior to transecting we performed flexible sigmoidoscopy to look for any evidence of significant scarring from what was described as a relatively large rectal polyp. There was nothing identifiable. Subsequent to this, we stepped the mesentery of the colon all the way up to the distal descending colon. ICG dye was injected and robot placed in Firefly mode to confirm that there was good vascularity of the chosen point of transection and there was. We placed the patient in some reverse Trendelenburg position and incised the lesser sac with the vessel-sealing device and mobilized the splenic flexure in its entirety all the way downward and medially. We took additional lateral attachments with hook as well to complete this mobilization. The colon was extracorporealized through a muscle splitting incision in the left lateral region. Wound protector was placed and the specimen removed. It was transected with an auto purse-chris device and the anvil of a 29 mm EEA stapler placed and this was tied snugly and the staple line was cleared of any fat. There was no evidence of diverticular disease. The colon was then placed back into the abdominal cavity and the abdomen reinsufflated to 15 mmHg.       The EEA stapler was placed per anus and advanced to the staple line and the spike opened just beside the staple line. We attached the anvil to the stapler and closed the device and after assuring that there was no twisting of the mesentery, fired the device. Air leak test was performed and was negative. Flexible sigmoidoscopy was performed. The anastomosis was widely patent with good pink tissue on either side and no defects. We also removed some small intestine which should come underneath the mesenteric band. The reach of the colon to the rectum was excellent. Fluid and blood were suctioned from the abdominal cavity. Ports removed under direct visualization. The extraction site was closed in two layers with a 0 Vicryl suture and #1 PDS suture. The right lower most port site had been expanded to a 12 mm port site for purpose of stapling. This was closed at the level of fascia with 0 Vicryl suture. Subcutaneous tissues were irrigated. Skin incisions were closed with 4-0 Monocryl subcuticular suture. Steri-Strips were applied. The patient tolerated the procedure well. All instrument, sponge and needle counts were correct at the end of the case x2. The patient awakened from anesthesia, was extubated and transported to the PACU in stable condition. MD Alondra Wood / RN  D: 02/27/2018 12:50     T: 02/27/2018 14:16  JOB #: [id number]

## 2018-02-27 NOTE — H&P (VIEW-ONLY)
HPI: Scott Mckenna is a 61 y.o. female presenting with chief complain of newly diagnosed descending colon cancer. Patient was having a colonoscopy. This was her first.  She noticed some blood per rectum over the last year. She did not have any changes in bowel habits. She denies nausea vomiting or abdominal pain. She denies recent unexplained weight loss. She has 5-6 bowel movements per day. They are loose and frequent. She denies a bowel regimen. She denies fecal incontinence or soilage. She denies urinary incontinence. She has no family medical history of colon rectal cancer. Past Medical History:   Diagnosis Date    Anxiety     Arthritis     psoriatic    High cholesterol     Hypertension     Sleep apnea     cpap       Past Surgical History:   Procedure Laterality Date    HX BACK SURGERY      HX COLONOSCOPY  01/29/2018     [facility] no history of colorectal cancer    Social History     Social History    Marital status:      Spouse name: N/A    Number of children: N/A    Years of education: N/A     Social History Main Topics    Smoking status: Never Smoker    Smokeless tobacco: Never Used    Alcohol use Yes      Comment: socially    Drug use: No    Sexual activity: Not Asked     Other Topics Concern    None     Social History Narrative       Review of Systems   Constitutional: Positive for malaise/fatigue. Negative for chills, diaphoresis, fever and weight loss. HENT: Positive for ear pain. Negative for congestion, ear discharge, hearing loss, nosebleeds, sinus pain, sore throat and tinnitus. Eyes: Negative. Respiratory: Negative. Negative for stridor. Cardiovascular: Negative. Gastrointestinal: Positive for blood in stool, diarrhea and heartburn. Negative for abdominal pain, constipation, melena, nausea and vomiting. Genitourinary: Negative. Musculoskeletal: Positive for joint pain. Negative for back pain, falls, myalgias and neck pain. Skin: Negative. Neurological: Positive for tingling. Negative for dizziness, tremors, sensory change, speech change, focal weakness, seizures, loss of consciousness, weakness and headaches. Bilateral hands   Endo/Heme/Allergies: Negative for environmental allergies and polydipsia. Bruises/bleeds easily. Psychiatric/Behavioral: Negative for depression, hallucinations, memory loss, substance abuse and suicidal ideas. The patient is nervous/anxious. The patient does not have insomnia. Outpatient Prescriptions Marked as Taking for the 2/8/18 encounter (Office Visit) with Romana Conteh MD   Medication Sig Dispense Refill    rosuvastatin (CRESTOR) 10 mg tablet 10 mg.      diclofenac EC (VOLTAREN) 75 mg EC tablet 75 mg.  cholecalciferol (VITAMIN D3) 1,000 unit cap Take 2,000 Units by mouth daily.  cyanocobalamin (VITAMIN B-12) 500 mcg tablet Take 500 mcg by mouth daily.  metroNIDAZOLE (FLAGYL) 500 mg tablet Take 1 Tab by mouth three (3) times daily for 1 day. Take at 1 pm, 2 pm and 11 pm the day prior to surgery 3 Tab 0    neomycin (MYCIFRADIN) 500 mg tablet Take 2 Tabs by mouth three (3) times daily for 3 doses. Take at 1 pm, 2 pm and 11 pm the day prior to surgery. 6 Tab 0    fish oil-dha-epa 1,200-144-216 mg cap Take  by mouth.  FERROUS FUMARATE/VIT BCOMP,C (SUPER B COMPLEX PO) Take  by mouth.  amLODIPine-benazepril (LOTREL) 10-20 mg per capsule Take 1 Cap by mouth daily.  escitalopram oxalate (LEXAPRO) 10 mg tablet Take 10 mg by mouth daily.  Hydrochlorothiazide (HYDRODIURIL) 12.5 mg tablet Take 12.5 mg by mouth daily. No Known Allergies    Vitals:    02/08/18 0828   BP: 132/84   Pulse: 78   Resp: 17   Temp: 98.2 °F (36.8 °C)   TempSrc: Oral   SpO2: 96%   Weight: 87.1 kg (192 lb)   Height: 5' (1.524 m)   PainSc:   0 - No pain       Physical Exam   Constitutional: She appears well-developed and well-nourished. HENT:   Head: Normocephalic and atraumatic.    Eyes: Conjunctivae and EOM are normal.   Abdominal: Soft. She exhibits no distension. There is no tenderness. Musculoskeletal: Normal range of motion. Lymphadenopathy:     She has no cervical adenopathy. Right: No inguinal adenopathy present. Left: No inguinal adenopathy present. Neurological: She exhibits normal muscle tone. Skin: Skin is warm and dry. No rash noted. Psychiatric: She has a normal mood and affect. Her speech is normal.   Rectum: normal tone, no mass    Colonoscopy report is reviewed, patient had multiple polyps, particularly a 3 cm polyp in the rectum which was removed. One Endo Clip was placed. Additional polyps were in the ascending colon, transverse colon. The 5 cm malignant appearing mass was found in the distal descending colon. This was at 40 cm. Tattoos were applied proximally and distally. Assessment / Plan    Newly diagnosed sigmoid versus descending colon cancer  Check CTs and CEA  Schedule robotic sigmoid colectomy  Would be a good idea to flex sig at time of anastomosis to be sure it is not at point of rectal polypectomy site    The diagnoses and plan were discussed with the patient. All questions answered. Plan of care agreed to by all concerned.

## 2018-02-27 NOTE — BRIEF OP NOTE
BRIEF OPERATIVE NOTE    Date of Procedure: 2/27/2018   Preoperative Diagnosis: Malignant neoplasm of sigmoid colon (HCC) [C18.7]  Postoperative Diagnosis: Malignant neoplasm of sigmoid colon ([address] [C18.7]    Procedure(s):  ROBOTIC SIGMOID COLECTOMY, SPLENIC FLEXURE MOBILIZATION    Surgeon(s) and Role:     * Fab Black MD - Primary         Assistant Staff: None      Surgical Staff:  Circ-1: Deonte Navas RN  Circ-Relief: Leslie Casas  Scrub Tech-1: Mariangel Fish  Scrub Tech-Relief: Erica Albert  Surg Asst-1: Lin Vallecillo  Surg Asst-Relief: Fede Rivera  Event Time In   Incision Start 8522   Incision Close      Anesthesia: General   Estimated Blood Loss: 75 ml  Specimens:   ID Type Source Tests Collected by Time Destination   1 : sigmoid colon Preservative Sigmoid  Fab Black MD 2/27/2018 1134 Pathology      Findings: sigmoid colon mass   Complications: none  Implants: * No implants in log *      [id number]

## 2018-02-27 NOTE — PROGRESS NOTES
Report given by Segundo Worthington RN from PACU regarding patients current medical situations for continuation of care.

## 2018-02-27 NOTE — IP AVS SNAPSHOT
[address] Patient: Ángel Frost MRN: [id number] About your hospitalization You were admitted on:  February 27, 2018 You last received care in the:  [facility] 1981 You were discharged on:  March 3, 2018 Why you were hospitalized Your primary diagnosis was:  Not on File Your diagnoses also included:  Colon Cancer (Hcc) Follow-up Information Follow up With Details Comments Contact Info Cabrera Valera MD   [phone]  [phone] [place] [address] 
[phone] Discharge Orders None A check gela indicates which time of day the medication should be taken. My Medications START taking these medications Instructions Each Dose to Equal  
 Morning Noon Evening Bedtime  
 oxyCODONE-acetaminophen 5-325 mg per tablet Commonly known as:  PERCOCET Your last dose was: Your next dose is: Take 1 Tab by mouth every four (4) hours as needed for Pain. Max Daily Amount: 6 Tabs. 1 Tab CONTINUE taking these medications Instructions Each Dose to Equal  
 Morning Noon Evening Bedtime  
 amLODIPine-benazepril 10-20 mg per capsule Commonly known as:  Melody Jumbo Your last dose was: Your next dose is: Take 1 Cap by mouth daily. 1 Cap  
    
   
   
   
  
 diclofenac EC 75 mg EC tablet Commonly known as:  VOLTAREN Your last dose was: Your next dose is:    
   
   
 75 mg.  
 75 mg  
    
   
   
   
  
 escitalopram oxalate 10 mg tablet Commonly known as:  Jennifer Piedad Your last dose was: Your next dose is: Take 10 mg by mouth daily. 10 mg  
    
   
   
   
  
 fish oil-dha-epa 1,200-144-216 mg Cap Your last dose was: Your next dose is: Take  by mouth. hydroCHLOROthiazide 12.5 mg tablet Commonly known as:  HYDRODIURIL Your last dose was: Your next dose is: Take 12.5 mg by mouth daily. 12.5 mg  
    
   
   
   
  
 rosuvastatin 10 mg tablet Commonly known as:  CRESTOR Your last dose was: Your next dose is:    
   
   
 10 mg.  
 10 mg  
    
   
   
   
  
 SUPER B COMPLEX PO Your last dose was: Your next dose is: Take  by mouth. VITAMIN B-12 500 mcg tablet Generic drug:  cyanocobalamin Your last dose was: Your next dose is: Take 500 mcg by mouth daily. 500 mcg VITAMIN D3 2,000 unit Tab Generic drug:  cholecalciferol (vitamin D3) Your last dose was: Your next dose is: Take  by mouth. Where to Get Your Medications Information on where to get these meds will be given to you by the nurse or doctor. ! Ask your nurse or doctor about these medications  
  oxyCODONE-acetaminophen 5-325 mg per tablet Discharge Instructions [employer] Activation Thank you for requesting access to [employer]. Please follow the instructions below to securely access and download your online medical record. [employer] allows you to send messages to your doctor, view your test results, renew your prescriptions, schedule appointments, and more. How Do I Sign Up? 1. In your internet browser, go to [email or web address]. 2. Click on the First Time User? Click Here link in the Sign In box. You will see the New Member Sign Up page. 3. Enter your [employer] Access Code exactly as it appears below. You will not need to use this code after youve completed the sign-up process. If you do not sign up before the expiration date, you must request a new code. [employer] Access Code: Activation code not generated Current [employer] Status: Patient Declined (This is the date your [employer] access code will ) 4. Enter the last four digits of your Social Security Number (xxxx) and Date of Birth (mm/dd/yyyy) as indicated and click Submit. You will be taken to the next sign-up page. 5. Create a [employer] ID. This will be your [employer] login ID and cannot be changed, so think of one that is secure and easy to remember. 6. Create a [employer] password. You can change your password at any time. 7. Enter your Password Reset Question and Answer. This can be used at a later time if you forget your password. 8. Enter your e-mail address. You will receive e-mail notification when new information is available in [address] 9. Click Sign Up. You can now view and download portions of your medical record. 10. Click the Download Summary menu link to download a portable copy of your medical information. Additional Information If you have questions, please visit the Frequently Asked Questions section of the [employer] website at [email or web address] Remember, [employer] is NOT to be used for urgent needs. For medical emergencies, dial 911. Patient armband removed and shredded DISCHARGE SUMMARY from Nurse PATIENT INSTRUCTIONS: 
 
 
F-face looks uneven A-arms unable to move or move unevenly S-speech slurred or non-existent T-time-call 911 as soon as signs and symptoms begin-DO NOT go Back to bed or wait to see if you get better-TIME IS BRAIN. Warning Signs of HEART ATTACK Call 911 if you have these symptoms: 
? Chest discomfort.  Most heart attacks involve discomfort in the center of the chest that lasts more than a few minutes, or that goes away and comes back. It can feel like uncomfortable pressure, squeezing, fullness, or pain. ? Discomfort in other areas of the upper body. Symptoms can include pain or discomfort in one or both arms, the back, neck, jaw, or stomach. ? Shortness of breath with or without chest discomfort. ? Other signs may include breaking out in a cold sweat, nausea, or lightheadedness. Don't wait more than five minutes to call [address]! Fast action can save your life. Calling 911 is almost always the fastest way to get lifesaving treatment. Emergency Medical Services staff can begin treatment when they arrive  up to an hour sooner than if someone gets to the hospital by car. The discharge information has been reviewed with the patient and spouse. The patient and spouse verbalized understanding. Discharge medications reviewed with the patient and spouse and appropriate educational materials and side effects teaching were provided. ___________________________________________________________________________________________________________________________________Instructions After Colectomy No heavy lifting (nothing more than 10-15 lbs) Low fiber diet - avoid raw fruits and vegetables, but small amounts of cooked vegetables are ok. Eat white bread products instead of wheat bread products. Meats, fish, chicken, noodles are ok. Showers over your wounds are ok, but avoid submerging wounds in pools or baths. The strips over your wound will fall off on their own or I will remove them in the office Make an office follow up appointment in 2 weeks Providers Seen During Your Hospitalization Provider Specialty Primary office phone Aron Landon MD Colon and Rectal Surgery [phone] Your Primary Care Physician (PCP) Primary Care Physician Office Phone Office Fax Darian Edmonds [address] [facility]  You are allergic to the following No active allergies Recent Documentation Height Weight BMI OB Status Smoking Status 1.524 m 84.8 kg 36.52 kg/m2 Menopause Never Smoker Emergency Contacts Name Discharge Info Relation Home Work Mobile Ld Lennon \"John\" DISCHARGE CAREGIVER [3] Spouse [3] [phone] Patient Belongings The following personal items are in your possession at time of discharge: 
  Dental Appliances: Lowers, Uppers (removed in belonging bag)  Visual Aid: Glasses, With patient      Home Medications: None   Jewelry: None  Clothing: Pajamas    Other Valuables: Cell Phone Please provide this summary of care documentation to your next provider. Signatures-by signing, you are acknowledging that this After Visit Summary has been reviewed with you and you have received a copy. Patient Signature:  ____________________________________________________________ Date:  ____________________________________________________________  
  
[facility] Provider Signature:  ____________________________________________________________ Date:  ____________________________________________________________

## 2018-02-27 NOTE — ANESTHESIA POSTPROCEDURE EVALUATION
Post-Anesthesia Evaluation and Assessment    Patient: Blake Beck MRN: [id number]  SSN: [id number]    YOB: 1960  Age: 61 y.o. Sex: female       Cardiovascular Function/Vital Signs  Visit Vitals    /78 (BP 1 Location: Left arm, BP Patient Position: Supine)    Pulse 82    Temp 36.6 °C (97.8 °F)    Resp 16    Ht 5' (1.524 m)    Wt 84.8 kg (187 lb)    SpO2 97%    BMI 36.52 kg/m2       Patient is status post general anesthesia for Procedure(s):  ROBOTIC SIGMOID COLECTOMY  SIGMOIDOSCOPY FLEXIBLE. Nausea/Vomiting: None    Postoperative hydration reviewed and adequate. Pain:  Pain Scale 1: Numeric (0 - 10) (02/27/18 1313)  Pain Intensity 1: 0 (At Surgical site) (02/27/18 1313)   Managed    Neurological Status:   Neuro (WDL): Within Defined Limits (02/27/18 1313)   At baseline    Mental Status and Level of Consciousness: Arousable    Pulmonary Status:   O2 Device: CO2 nasal cannula (02/27/18 1313)   Adequate oxygenation and airway patent    Complications related to anesthesia: None    Post-anesthesia assessment completed.  No concerns      Signed By: Juan Hyatt MD     February 27, 2018

## 2018-02-28 LAB
ANION GAP SERPL CALC-SCNC: 7 MMOL/L (ref 3–18)
BUN SERPL-MCNC: 7 MG/DL (ref 7–18)
BUN/CREAT SERPL: 10 (ref 12–20)
CALCIUM SERPL-MCNC: 8.5 MG/DL (ref 8.5–10.1)
CHLORIDE SERPL-SCNC: 106 MMOL/L (ref 100–108)
CO2 SERPL-SCNC: 28 MMOL/L (ref 21–32)
CREAT SERPL-MCNC: 0.7 MG/DL (ref 0.6–1.3)
ERYTHROCYTE [DISTWIDTH] IN BLOOD BY AUTOMATED COUNT: 15 % (ref 11.6–14.5)
GLUCOSE SERPL-MCNC: 129 MG/DL (ref 74–99)
HCT VFR BLD AUTO: 33.1 % (ref 35–45)
HGB BLD-MCNC: 10.6 G/DL (ref 12–16)
MAGNESIUM SERPL-MCNC: 1.7 MG/DL (ref 1.6–2.6)
MCH RBC QN AUTO: 27.3 PG (ref 24–34)
MCHC RBC AUTO-ENTMCNC: 32 G/DL (ref 31–37)
MCV RBC AUTO: 85.3 FL (ref 74–97)
PHOSPHATE SERPL-MCNC: 3.9 MG/DL (ref 2.5–4.9)
PLATELET # BLD AUTO: 206 K/UL (ref 135–420)
PMV BLD AUTO: 11 FL (ref 9.2–11.8)
POTASSIUM SERPL-SCNC: 4 MMOL/L (ref 3.5–5.5)
RBC # BLD AUTO: 3.88 M/UL (ref 4.2–5.3)
SODIUM SERPL-SCNC: 141 MMOL/L (ref 136–145)
WBC # BLD AUTO: 7.6 K/UL (ref 4.6–13.2)

## 2018-02-28 PROCEDURE — 74011250637 HC RX REV CODE- 250/637: Performed by: COLON & RECTAL SURGERY

## 2018-02-28 PROCEDURE — 84100 ASSAY OF PHOSPHORUS: CPT | Performed by: COLON & RECTAL SURGERY

## 2018-02-28 PROCEDURE — 74011000250 HC RX REV CODE- 250: Performed by: COLON & RECTAL SURGERY

## 2018-02-28 PROCEDURE — 85027 COMPLETE CBC AUTOMATED: CPT | Performed by: COLON & RECTAL SURGERY

## 2018-02-28 PROCEDURE — 80048 BASIC METABOLIC PNL TOTAL CA: CPT | Performed by: COLON & RECTAL SURGERY

## 2018-02-28 PROCEDURE — 36415 COLL VENOUS BLD VENIPUNCTURE: CPT | Performed by: COLON & RECTAL SURGERY

## 2018-02-28 PROCEDURE — 65270000029 HC RM PRIVATE

## 2018-02-28 PROCEDURE — 74011250636 HC RX REV CODE- 250/636: Performed by: COLON & RECTAL SURGERY

## 2018-02-28 PROCEDURE — 83735 ASSAY OF MAGNESIUM: CPT | Performed by: COLON & RECTAL SURGERY

## 2018-02-28 RX ADMIN — AMLODIPINE BESYLATE 10 MG: 10 TABLET ORAL at 08:34

## 2018-02-28 RX ADMIN — HYDROMORPHONE HYDROCHLORIDE 0.5 MG: 1 INJECTION, SOLUTION INTRAMUSCULAR; INTRAVENOUS; SUBCUTANEOUS at 09:46

## 2018-02-28 RX ADMIN — HEPARIN SODIUM 5000 UNITS: 5000 INJECTION, SOLUTION INTRAVENOUS; SUBCUTANEOUS at 08:36

## 2018-02-28 RX ADMIN — HYDROMORPHONE HYDROCHLORIDE 0.5 MG: 1 INJECTION, SOLUTION INTRAMUSCULAR; INTRAVENOUS; SUBCUTANEOUS at 02:21

## 2018-02-28 RX ADMIN — CEFAZOLIN SODIUM 2 G: 2 SOLUTION INTRAVENOUS at 11:15

## 2018-02-28 RX ADMIN — HYDROMORPHONE HYDROCHLORIDE 0.5 MG: 1 INJECTION, SOLUTION INTRAMUSCULAR; INTRAVENOUS; SUBCUTANEOUS at 06:13

## 2018-02-28 RX ADMIN — SODIUM CHLORIDE, SODIUM LACTATE, POTASSIUM CHLORIDE, AND CALCIUM CHLORIDE 125 ML/HR: 600; 310; 30; 20 INJECTION, SOLUTION INTRAVENOUS at 06:13

## 2018-02-28 RX ADMIN — METRONIDAZOLE 500 MG: 500 INJECTION, SOLUTION INTRAVENOUS at 08:35

## 2018-02-28 RX ADMIN — ROSUVASTATIN CALCIUM 10 MG: 10 TABLET, FILM COATED ORAL at 22:00

## 2018-02-28 RX ADMIN — HEPARIN SODIUM 5000 UNITS: 5000 INJECTION, SOLUTION INTRAVENOUS; SUBCUTANEOUS at 17:08

## 2018-02-28 RX ADMIN — CEFAZOLIN SODIUM 2 G: 2 SOLUTION INTRAVENOUS at 00:00

## 2018-02-28 RX ADMIN — HYDROMORPHONE HYDROCHLORIDE 0.5 MG: 1 INJECTION, SOLUTION INTRAMUSCULAR; INTRAVENOUS; SUBCUTANEOUS at 20:28

## 2018-02-28 RX ADMIN — FAMOTIDINE 20 MG: 10 INJECTION, SOLUTION INTRAVENOUS at 08:34

## 2018-02-28 RX ADMIN — HYDROMORPHONE HYDROCHLORIDE 0.5 MG: 1 INJECTION, SOLUTION INTRAMUSCULAR; INTRAVENOUS; SUBCUTANEOUS at 14:35

## 2018-02-28 RX ADMIN — ESCITALOPRAM OXALATE 10 MG: 10 TABLET ORAL at 08:34

## 2018-02-28 RX ADMIN — FAMOTIDINE 20 MG: 10 INJECTION, SOLUTION INTRAVENOUS at 20:28

## 2018-02-28 NOTE — ROUTINE PROCESS
Bedside and Verbal shift change report given to [address] (oncoming nurse) by Jose Raul Hdez RN (offgoing nurse). Report included the following information SBAR, Kardex, MAR and Recent Results.     SITUATION:    Code Status: No Order   Reason for Admission: Malignant neoplasm of sigmoid colon ([facility] [C18.7]    [facility] day: 1   Problem List:       Hospital Problems  Date Reviewed: 2/27/2018          Codes Class Noted POA    Colon cancer [facility]) ICD-10-CM: C18.9  ICD-9-CM: 153.9  2/27/2018 Unknown              BACKGROUND:    Past Medical History:   Past Medical History:   Diagnosis Date    Anxiety     Arthritis     psoriatic    High cholesterol     Hypertension     Sleep apnea     cpap         Patient taking anticoagulants yes     ASSESSMENT:    Changes in Assessment Throughout Shift: tbd     Patient has Central Line: no Reasons if yes:    Patient has Tony Cath: yes Reasons if yes: Surgical Precedure      Last Vitals:     Vitals:    02/27/18 1416 02/27/18 1722 02/27/18 2056 02/28/18 0026   BP: 126/78 113/75 125/77 117/75   Pulse: 82 97 94 86   Resp: 16 18 16 18   Temp: 97.8 °F (36.6 °C) 97.4 °F (36.3 °C) 98.5 °F (36.9 °C) 98.2 °F (36.8 °C)   SpO2: 97% 97% 97% 92%   Weight:       Height:            IV and DRAINS (will only show if present)   Peripheral IV 02/27/18 Right Wrist-Site Assessment: Clean, dry, & intact  Peripheral IV 02/27/18 Left Wrist-Site Assessment: Clean, dry, & intact     WOUND (if present)   Wound Type:  none   Dressing present Dressing Present : No   Wound Concerns/Notes:  none     PAIN    Pain Assessment    Pain Intensity 1: 5 (02/28/18 0220)    Pain Location 1: Abdomen    Pain Intervention(s) 1: Medication (see MAR)    Patient Stated Pain Goal: 0  o Interventions for Pain:  yes  o Intervention effective: yes  o Time of last intervention: TBD   o Reassessment Completed: yes      Last 3 Weights:  Last 3 Recorded Weights in this Encounter    02/23/18 1520 02/27/18 0645   Weight: 86.2 kg (190 lb) 84.8 kg (187 lb)     Weight change:      INTAKE/OUPUT    Current Shift: 02/27 1901 - 02/28 0700  In: 1210.4 [I.V.:1210.4]  Out: 1400 [OMAPK:7526]    Last three shifts: 02/26 0701 - 02/27 1900  In: 3557 [I.V.:3650]  Out: 830 [Urine:800]     LAB RESULTS   No results for input(s): WBC, HGB, HCT, PLT, HGBEXT, HCTEXT, PLTEXT in the last 72 hours. No results for input(s): NA, K, GLU, BUN, CREA, CA, MG, INR in the last 72 hours. No lab exists for component: PT, PTT, INREXT    RECOMMENDATIONS AND DISCHARGE PLANNING     1. Pending tests/procedures/ Plan of Care or Other Needs: TBD     2. Discharge plan for patient and Needs/Barriers: TBD    3. Estimated Discharge Date: TBD Posted on Whiteboard in Patients Room: no      4. The patient's care plan was reviewed with the oncoming nurse. \"HEALS\" SAFETY CHECK      Fall Risk    Total Score: 3    Safety Measures: Safety Measures: Bed/Chair-Wheels locked, Bed in low position, Call light within reach, Side rails X 3    A safety check occurred in the patient's room between off going nurse and oncoming nurse listed above. The safety check included the below items  Area Items   H  High Alert Medications - Verify all high alert medication drips (heparin, PCA, etc.)   E  Equipment - Suction is set up for ALL patients (with yanker)  - Red plugs utilized for all equipment (IV pumps, etc.)  - WOWs wiped down at end of shift.  - Room stocked with oxygen, suction, and other unit-specific supplies   A  Alarms - Bed alarm is set for fall risk patients  - Ensure chair alarm is in place and activated if patient is up in a chair   L  Lines - Check IV for any infiltration  - Tony bag is empty if patient has a Tony   - Tubing and IV bags are labeled   S  Safety   - Room is clean, patient is clean, and equipment is clean. - Hallways are clear from equipment besides carts.    - Fall bracelet on for fall risk patients  - Ensure room is clear and free of clutter  - Suction is set up for ALL patients (with sydney)  - Hallways are clear from equipment besides carts.    - Isolation precautions followed, supplies available outside room, sign posted     Jose Raul Hdez RN

## 2018-02-28 NOTE — PROGRESS NOTES
[facility]  [address] [place] [address]  [phone]  Colon and Rectal Surgery Progress Note      Patient: Taina Daley MRN: [id number]  SSN: [id number]    YOB: 1960  Age: 61 y.o. Sex: female      Admit Date: 2/27/2018    LOS: 1 day     Subjective:     Pain well controlled. Denies nausea or vomiting. Not passing gas yet. Denies chest pain, shortness of breath, or headaches. Objective:     Vitals:    02/27/18 2056 02/28/18 0026 02/28/18 0355 02/28/18 0758   BP: 125/77 117/75 129/79 117/75   Pulse: 94 86 89 87   Resp: 16 18 18 18   Temp: 98.5 °F (36.9 °C) 98.2 °F (36.8 °C) 98.9 °F (37.2 °C) 98 °F (36.7 °C)   SpO2: 97% 92% 93% 93%   Weight:       Height:            Intake and Output:  Current Shift:    Last three shifts: 02/26 1901 - 02/28 0700  In: 4860.4 [I.V.:4860.4]  Out: 2230 [Urine:2200]    Physical Exam:   Constitutional: Appears well-developed and well-nourished. No distress. Cardiovascular: Normal rate, regular rhythm and normal heart sounds. Exam reveals no gallop and no friction rub. No murmur heard. Pulmonary/Chest: Effort normal and breath sounds normal. No respiratory distress. No wheezes, rales, or rhonchi. Abdominal: Soft. Bowel sounds are normal. There is no distension. Appropriately tender to palpation near incision sites. Abdominal incisions w/steri strips in place c/d/i. There is no rebound and no guarding.      Lab/Data Review:  Recent Results (from the past 12 hour(s))   METABOLIC PANEL, BASIC    Collection Time: 02/28/18  4:45 AM   Result Value Ref Range    Sodium 141 136 - 145 mmol/L    Potassium 4.0 3.5 - 5.5 mmol/L    Chloride 106 100 - 108 mmol/L    CO2 28 21 - 32 mmol/L    Anion gap 7 3.0 - 18 mmol/L    Glucose 129 (H) 74 - 99 mg/dL    BUN 7 7.0 - 18 MG/DL    Creatinine 0.70 0.6 - 1.3 MG/DL    BUN/Creatinine ratio 10 (L) 12 - 20      GFR est AA >60 >60 ml/min/1.73m2    GFR est non-AA >60 >60 ml/min/1.73m2    Calcium 8.5 8.5 - 10.1 MG/DL   CBC W/O DIFF    Collection Time: 02/28/18  4:45 AM   Result Value Ref Range    WBC 7.6 4.6 - 13.2 K/uL    RBC 3.88 (L) 4.20 - 5.30 M/uL    HGB 10.6 (L) 12.0 - 16.0 g/dL    HCT 33.1 (L) 35.0 - 45.0 %    MCV 85.3 74.0 - 97.0 FL    MCH 27.3 24.0 - 34.0 PG    MCHC 32.0 31.0 - 37.0 g/dL    RDW 15.0 (H) 11.6 - 14.5 %    PLATELET 516 255 - 856 K/uL    MPV 11.0 9.2 - 11.8 FL   MAGNESIUM    Collection Time: 02/28/18  4:45 AM   Result Value Ref Range    Magnesium 1.7 1.6 - 2.6 mg/dL   PHOSPHORUS    Collection Time: 02/28/18  4:45 AM   Result Value Ref Range    Phosphorus 3.9 2.5 - 4.9 MG/DL       Assessment:     POD #1 s/p robotic sigmoid colectomy, splenic flexure mobilization 2/2 adenocarcinoma of the sigmoid colon    Plan:     - Neuro: Continue IV dilaudid 0.5 mg q3h prn for moderate pain + PO tylenol 650 mg q6h prn for mild pain/fever  - CV:  Monitor VS per unit routine  - Resp: Encourage Is use  - GI/Nutrition: Continue NPO for now; consider advancing diet to clear liquid this evening  - : Continue bruce; Strict I&O's; consider d/c bruce tomorrow  - Fluids: Continue LR @ 125 cc/hr; will transition to maintenance fluids tomorrow  - ID: Continue mary-op abx for 24 hrs post-surgery  - DVT: SQH + SCD's    Signed By: Denae Donis MD    February 28, 2018

## 2018-02-28 NOTE — PROGRESS NOTES
Patient stated she will walk later on when encourage to ambulate.  Also encourage the use of ICS [place].

## 2018-03-01 LAB
ANION GAP SERPL CALC-SCNC: 9 MMOL/L (ref 3–18)
BUN SERPL-MCNC: 6 MG/DL (ref 7–18)
BUN/CREAT SERPL: 11 (ref 12–20)
CALCIUM SERPL-MCNC: 9.3 MG/DL (ref 8.5–10.1)
CHLORIDE SERPL-SCNC: 106 MMOL/L (ref 100–108)
CO2 SERPL-SCNC: 27 MMOL/L (ref 21–32)
CREAT SERPL-MCNC: 0.53 MG/DL (ref 0.6–1.3)
ERYTHROCYTE [DISTWIDTH] IN BLOOD BY AUTOMATED COUNT: 15.1 % (ref 11.6–14.5)
GLUCOSE SERPL-MCNC: 108 MG/DL (ref 74–99)
HCT VFR BLD AUTO: 33.6 % (ref 35–45)
HGB BLD-MCNC: 10.3 G/DL (ref 12–16)
MAGNESIUM SERPL-MCNC: 2 MG/DL (ref 1.6–2.6)
MCH RBC QN AUTO: 26.5 PG (ref 24–34)
MCHC RBC AUTO-ENTMCNC: 30.7 G/DL (ref 31–37)
MCV RBC AUTO: 86.4 FL (ref 74–97)
PHOSPHATE SERPL-MCNC: 2.8 MG/DL (ref 2.5–4.9)
PLATELET # BLD AUTO: 192 K/UL (ref 135–420)
PMV BLD AUTO: 10.6 FL (ref 9.2–11.8)
POTASSIUM SERPL-SCNC: 3.9 MMOL/L (ref 3.5–5.5)
RBC # BLD AUTO: 3.89 M/UL (ref 4.2–5.3)
SODIUM SERPL-SCNC: 142 MMOL/L (ref 136–145)
WBC # BLD AUTO: 7.7 K/UL (ref 4.6–13.2)

## 2018-03-01 PROCEDURE — 74011000250 HC RX REV CODE- 250: Performed by: COLON & RECTAL SURGERY

## 2018-03-01 PROCEDURE — 85027 COMPLETE CBC AUTOMATED: CPT | Performed by: COLON & RECTAL SURGERY

## 2018-03-01 PROCEDURE — 65270000029 HC RM PRIVATE

## 2018-03-01 PROCEDURE — 80048 BASIC METABOLIC PNL TOTAL CA: CPT | Performed by: COLON & RECTAL SURGERY

## 2018-03-01 PROCEDURE — 84100 ASSAY OF PHOSPHORUS: CPT | Performed by: COLON & RECTAL SURGERY

## 2018-03-01 PROCEDURE — 74011250637 HC RX REV CODE- 250/637: Performed by: COLON & RECTAL SURGERY

## 2018-03-01 PROCEDURE — 36415 COLL VENOUS BLD VENIPUNCTURE: CPT | Performed by: COLON & RECTAL SURGERY

## 2018-03-01 PROCEDURE — 83735 ASSAY OF MAGNESIUM: CPT | Performed by: COLON & RECTAL SURGERY

## 2018-03-01 PROCEDURE — 74011250636 HC RX REV CODE- 250/636: Performed by: COLON & RECTAL SURGERY

## 2018-03-01 RX ORDER — FAMOTIDINE 20 MG/1
20 TABLET, FILM COATED ORAL 2 TIMES DAILY
Status: DISCONTINUED | OUTPATIENT
Start: 2018-03-01 | End: 2018-03-03 | Stop reason: HOSPADM

## 2018-03-01 RX ORDER — KETOROLAC TROMETHAMINE 15 MG/ML
15 INJECTION, SOLUTION INTRAMUSCULAR; INTRAVENOUS EVERY 6 HOURS
Status: DISCONTINUED | OUTPATIENT
Start: 2018-03-01 | End: 2018-03-03 | Stop reason: HOSPADM

## 2018-03-01 RX ORDER — DEXTROSE, SODIUM CHLORIDE, AND POTASSIUM CHLORIDE 5; .45; .15 G/100ML; G/100ML; G/100ML
75 INJECTION INTRAVENOUS CONTINUOUS
Status: DISCONTINUED | OUTPATIENT
Start: 2018-03-01 | End: 2018-03-03 | Stop reason: HOSPADM

## 2018-03-01 RX ADMIN — ROSUVASTATIN CALCIUM 10 MG: 10 TABLET, FILM COATED ORAL at 21:13

## 2018-03-01 RX ADMIN — HEPARIN SODIUM 5000 UNITS: 5000 INJECTION, SOLUTION INTRAVENOUS; SUBCUTANEOUS at 18:57

## 2018-03-01 RX ADMIN — FAMOTIDINE 20 MG: 20 TABLET, FILM COATED ORAL at 18:54

## 2018-03-01 RX ADMIN — FAMOTIDINE 20 MG: 10 INJECTION, SOLUTION INTRAVENOUS at 09:30

## 2018-03-01 RX ADMIN — ESCITALOPRAM OXALATE 10 MG: 10 TABLET ORAL at 09:30

## 2018-03-01 RX ADMIN — AMLODIPINE BESYLATE 10 MG: 10 TABLET ORAL at 09:30

## 2018-03-01 RX ADMIN — HEPARIN SODIUM 5000 UNITS: 5000 INJECTION, SOLUTION INTRAVENOUS; SUBCUTANEOUS at 00:00

## 2018-03-01 RX ADMIN — HEPARIN SODIUM 5000 UNITS: 5000 INJECTION, SOLUTION INTRAVENOUS; SUBCUTANEOUS at 09:30

## 2018-03-01 RX ADMIN — DEXTROSE MONOHYDRATE, SODIUM CHLORIDE, AND POTASSIUM CHLORIDE 75 ML/HR: 50; 4.5; 1.49 INJECTION, SOLUTION INTRAVENOUS at 21:12

## 2018-03-01 RX ADMIN — KETOROLAC TROMETHAMINE 15 MG: 15 INJECTION, SOLUTION INTRAMUSCULAR; INTRAVENOUS at 12:17

## 2018-03-01 RX ADMIN — KETOROLAC TROMETHAMINE 15 MG: 15 INJECTION, SOLUTION INTRAMUSCULAR; INTRAVENOUS at 18:57

## 2018-03-01 RX ADMIN — DEXTROSE MONOHYDRATE, SODIUM CHLORIDE, AND POTASSIUM CHLORIDE 75 ML/HR: 50; 4.5; 1.49 INJECTION, SOLUTION INTRAVENOUS at 12:15

## 2018-03-01 NOTE — ROUTINE PROCESS
Bedside and Verbal shift change report given to Lasha Thayer RN (oncoming nurse) by Naldo Thao RN   (offgoing nurse). Report included the following information SBAR, Kardex, Intake/Output and MAR.

## 2018-03-01 NOTE — PROGRESS NOTES
This patient meets the following P&T approved criteria and has been converted from IV to oral therapy for the following medication: Famotidine    1. INITIAL IV Therapy  Patient has received an initial 48 hours of IV therapy for azithromycin and levetiracetam OR received an initial 24 hours of IV therapy for all other medications. 2. Clinically Stable   HR?100 Pulse (Heart Rate): 88   SBP ?90 BP: 148/86   RR ? 24 Resp Rate: 18   Temp < 100.4° F Temp: 98.9 °F (37.2 °C)   O2 ?90% O2 Sat (%): 93 %   3. Functional GI Tract  Please note that a No response means the patient is not a candidate for IV to PO conversion   No active NPO order (check order review activity) Yes   Taking enteral meds (PO, NG, GT, etc)   (check medication activity or order review for a tube) Yes   Able to tolerate enteral medications without risk of aspiration   (check progress notes) Yes   Non-tubed patients can swallow without difficulty  (check progress notes) Yes   Eating or tolerating feeds at goal rate   (check progress notes, order review activity, LDA flowsheet)  Diet =Clear liquid  Yes   NG access is available if patient is unconscious Yes   NG tube, if present,  is not on continuous suction   (check progress notes) Yes   Continuous tube feedings can be interrupted for an extended period of time for the drug administration (e.g. Ciprofloxacin)   (check progress notes, order review) Yes   No severe or persistent nausea or vomiting       (check progress notes, use of antiemetics) Yes   No malabsorption  syndromes   (e.g. gastrectomy, intestinal resection, bariatric surgery, uncontrolled diarrhea, short bowel syndrome, ileus, gastrointestinal obstruction)   (check progress notes) Yes   No active gastrointestinal bleeding     (check progress notes)   Yes   4.  Infection Specific Criteria (ABX only)  Please note that a No response means the patient is not a candidate for IV to PO conversion   WBC normal or normalizing (check labs) Yes    WBC =  Lab Results   Component Value Date/Time    WBC 7.7 03/01/2018 03:38 AM      Neutropenia (ANC < 1500 cells/microL) NOT present  (check labs)   Yes   No infection with high treatment failure rate  (e.g. meningitis, brain abscess,orbital cellulitis, other central nervous system infections, endophthalmitis, mediastinitis,  IV TMP/SMX treatment for PCP in AIDs, fungemia, ventriculitis, endocarditis, Pseudomonas pneumonia, intra-abdominal abscess, empyema, necrotizing soft tissue infections, osteomyelitis or post-obstructive pneumonia) (check indication for antibiotic, progress notes) Yes   5. Miscellaneous Criteria    Please note that a No response means the patient is not a candidate for IV to PO conversion   Not on high doses of vasopressor medications        (check medication activity) Yes   Not actively seizing or risk of actively seizing        (check progress notes) Yes     Pharmacist Notes: Famotidine 20 mg IV to PO per Protocol  Pharmacy will continue to monitor for the duration of therapy to ensure the patient continues to meet protocol criteria and the conversion remains appropriate.     Shree Long, Pharmacist  3/1/2018 2:17 PM

## 2018-03-01 NOTE — ROUTINE PROCESS
Bedside and Verbal shift change report given to Madison Rodriguez (oncoming nurse) by Romeo Espinoza RN (offgoing nurse). Report included the following information SBAR, Kardex, MAR and Recent Results.     SITUATION:    Code Status: No Order   Reason for Admission: Malignant neoplasm of sigmoid colon ([facility] [address] [C18.7]    [facility] day: 2   Problem List:       Hospital Problems  Date Reviewed: 2/27/2018          Codes Class Noted POA    Colon cancer [facility]) ICD-10-CM: C18.9  ICD-9-CM: 153.9  2/27/2018 Unknown              BACKGROUND:    Past Medical History:   Past Medical History:   Diagnosis Date    Anxiety     Arthritis     psoriatic    High cholesterol     Hypertension     Sleep apnea     cpap         Patient taking anticoagulants yes     ASSESSMENT:    Changes in Assessment Throughout Shift: tbd     Patient has Central Line: no Reasons if yes:    Patient has Tony Cath: yes Reasons if yes: Surgical Precedure      Last Vitals:     Vitals:    02/28/18 1135 02/28/18 1511 02/28/18 2351 03/01/18 0510   BP: 116/67 125/77 137/81 145/69   Pulse: 93 98 90 90   Resp: 18 18 18 18   Temp: 98.9 °F (37.2 °C) 97.3 °F (36.3 °C) 97.4 °F (36.3 °C) 98.9 °F (37.2 °C)   SpO2: 90% 90% 90% 92%   Weight:       Height:            IV and DRAINS (will only show if present)   Peripheral IV 02/27/18 Right Wrist-Site Assessment: Clean, dry, & intact  Peripheral IV 02/27/18 Left Wrist-Site Assessment: Clean, dry, & intact     WOUND (if present)   Wound Type:  none   Dressing present Dressing Present : No   Wound Concerns/Notes:  none     PAIN    Pain Assessment    Pain Intensity 1: 0 (02/28/18 2058)    Pain Location 1: Abdomen    Pain Intervention(s) 1: Medication (see MAR)    Patient Stated Pain Goal: 0  o Interventions for Pain:  yes  o Intervention effective: yes  o Time of last intervention: TBD   o Reassessment Completed: yes      Last 3 Weights:  Last 3 Recorded Weights in this Encounter    02/23/18 1520 02/27/18 0645   Weight: 86.2 kg (190 lb) 84.8 kg (187 lb)     Weight change:      INTAKE/OUPUT    Current Shift: 02/28 1901 - 03/01 0700  In: 2745.8 [I.V.:2745.8]  Out: 1400 [YTTFZ:9752]    Last three shifts: 02/27 0701 - 02/28 1900  In: 5340.4 [P.O.:480; I.V.:4860.4]  Out: 3230 [Urine:3200]     LAB RESULTS     Recent Labs      03/01/18   0338  02/28/18   0445   WBC  7.7  7.6   HGB  10.3*  10.6*   HCT  33.6*  33.1*   PLT  192  206        Recent Labs      02/28/18   0445   NA  141   K  4.0   GLU  129*   BUN  7   CREA  0.70   CA  8.5   MG  1.7       RECOMMENDATIONS AND DISCHARGE PLANNING     1. Pending tests/procedures/ Plan of Care or Other Needs: TBD     2. Discharge plan for patient and Needs/Barriers: TBD    3. Estimated Discharge Date: TBD Posted on Whiteboard in Patients Room: no      4. The patient's care plan was reviewed with the oncoming nurse. \"HEALS\" SAFETY CHECK      Fall Risk    Total Score: 2    Safety Measures: Safety Measures: Bed/Chair-Wheels locked, Bed in low position, Call light within reach, Side rails X 3    A safety check occurred in the patient's room between off going nurse and oncoming nurse listed above. The safety check included the below items  Area Items   H  High Alert Medications - Verify all high alert medication drips (heparin, PCA, etc.)   E  Equipment - Suction is set up for ALL patients (with yanker)  - Red plugs utilized for all equipment (IV pumps, etc.)  - WOWs wiped down at end of shift.  - Room stocked with oxygen, suction, and other unit-specific supplies   A  Alarms - Bed alarm is set for fall risk patients  - Ensure chair alarm is in place and activated if patient is up in a chair   L  Lines - Check IV for any infiltration  - Tony bag is empty if patient has a Tony   - Tubing and IV bags are labeled   S  Safety   - Room is clean, patient is clean, and equipment is clean. - Hallways are clear from equipment besides carts.    - Fall bracelet on for fall risk patients  - Ensure room is clear and free of clutter  - Suction is set up for ALL patients (with sydney)  - Hallways are clear from equipment besides carts.    - Isolation precautions followed, supplies available outside room, sign posted     Romeo Espinoza RN

## 2018-03-01 NOTE — PROGRESS NOTES
Care Management Interventions  PCP Verified by CM: Yes (Dr. Lissy Crocker)  Palliative Care Criteria Met (RRAT>21 & CHF Dx)?: No  Mode of Transport at Discharge: Other (see comment) (Family)  Current Support Network: Lives with Spouse, Own Home  Confirm Follow Up Transport: Family  Plan discussed with Pt/Family/Caregiver: Yes  Discharge Location  Discharge Placement:  (Most likely home to self care, will continue to follow. )     62 yo admitted for neoplasm of the colon resection. Lives with her  works at [employer], independent for all care prior to surgery. No DME, No HHC or SNF/Rehab experience. Lives in a 2 story home with bedroom upstairs. Denies needs at this time, will continue to follow.

## 2018-03-01 NOTE — PROGRESS NOTES
[facility]  [address] [place] [address] [id number] [phone]  Colon and Rectal Surgery Progress Note      Patient: Codie Tena MRN: [id number]  SSN: [id number]    YOB: 1960  Age: 61 y.o. Sex: female      Admit Date: 2/27/2018    LOS: 2 days     Subjective:     Pain well controlled. Passing flatus. Objective:     Vitals:    02/28/18 1135 02/28/18 1511 02/28/18 2351 03/01/18 0510   BP: 116/67 125/77 137/81 145/69   Pulse: 93 98 90 90   Resp: 18 18 18 18   Temp: 98.9 °F (37.2 °C) 97.3 °F (36.3 °C) 97.4 °F (36.3 °C) 98.9 °F (37.2 °C)   SpO2: 90% 90% 90% 92%   Weight:       Height:            Intake and Output:  Current Shift:    Last three shifts: 02/27 1901 - 03/01 0700  In: 4436.3 [P.O.:480; I.V.:3956.3]  Out: 3800 [Urine:3800]    Physical Exam:   Constitutional: Appears well-developed and well-nourished. No distress. Cardiovascular: Normal rate, regular rhythm and normal heart sounds. Exam reveals no gallop and no friction rub. No murmur heard. Pulmonary/Chest: Effort normal and breath sounds normal. No respiratory distress. No wheezes, rales, or rhonchi. Abdominal: Soft. Bowel sounds are normal. There is no distension. Appropriately tender to palpation near incision sites. Abdominal incisions w/steri strips in place c/d/i. There is no rebound and no guarding.      Lab/Data Review:  Recent Results (from the past 12 hour(s))   METABOLIC PANEL, BASIC    Collection Time: 03/01/18  3:38 AM   Result Value Ref Range    Sodium 142 136 - 145 mmol/L    Potassium 3.9 3.5 - 5.5 mmol/L    Chloride 106 100 - 108 mmol/L    CO2 27 21 - 32 mmol/L    Anion gap 9 3.0 - 18 mmol/L    Glucose 108 (H) 74 - 99 mg/dL    BUN 6 (L) 7.0 - 18 MG/DL    Creatinine 0.53 (L) 0.6 - 1.3 MG/DL    BUN/Creatinine ratio 11 (L) 12 - 20      GFR est AA >60 >60 ml/min/1.73m2    GFR est non-AA >60 >60 ml/min/1.73m2    Calcium 9.3 8.5 - 10.1 MG/DL   CBC W/O DIFF    Collection Time: 03/01/18  3:38 AM   Result Value Ref Range    WBC 7.7 4.6 - 13.2 K/uL    RBC 3.89 (L) 4.20 - 5.30 M/uL    HGB 10.3 (L) 12.0 - 16.0 g/dL    HCT 33.6 (L) 35.0 - 45.0 %    MCV 86.4 74.0 - 97.0 FL    MCH 26.5 24.0 - 34.0 PG    MCHC 30.7 (L) 31.0 - 37.0 g/dL    RDW 15.1 (H) 11.6 - 14.5 %    PLATELET 206 484 - 267 K/uL    MPV 10.6 9.2 - 11.8 FL   MAGNESIUM    Collection Time: 03/01/18  3:38 AM   Result Value Ref Range    Magnesium 2.0 1.6 - 2.6 mg/dL   PHOSPHORUS    Collection Time: 03/01/18  3:38 AM   Result Value Ref Range    Phosphorus 2.8 2.5 - 4.9 MG/DL       Assessment:     POD #2 s/p robotic sigmoid colectomy, splenic flexure mobilization 2/2 adenocarcinoma of the sigmoid colon    Plan:     Advance to clears  D/C bruce  Maintenance IVF      Signed By: Kira Shen MD    March 1, 2018

## 2018-03-02 LAB
ANION GAP SERPL CALC-SCNC: 6 MMOL/L (ref 3–18)
BUN SERPL-MCNC: 6 MG/DL (ref 7–18)
BUN/CREAT SERPL: 11 (ref 12–20)
CALCIUM SERPL-MCNC: 9.2 MG/DL (ref 8.5–10.1)
CHLORIDE SERPL-SCNC: 107 MMOL/L (ref 100–108)
CO2 SERPL-SCNC: 29 MMOL/L (ref 21–32)
CREAT SERPL-MCNC: 0.53 MG/DL (ref 0.6–1.3)
ERYTHROCYTE [DISTWIDTH] IN BLOOD BY AUTOMATED COUNT: 14.4 % (ref 11.6–14.5)
GLUCOSE SERPL-MCNC: 131 MG/DL (ref 74–99)
HCT VFR BLD AUTO: 33.5 % (ref 35–45)
HGB BLD-MCNC: 10.6 G/DL (ref 12–16)
MAGNESIUM SERPL-MCNC: 2 MG/DL (ref 1.6–2.6)
MCH RBC QN AUTO: 26.9 PG (ref 24–34)
MCHC RBC AUTO-ENTMCNC: 31.6 G/DL (ref 31–37)
MCV RBC AUTO: 85 FL (ref 74–97)
PHOSPHATE SERPL-MCNC: 2.2 MG/DL (ref 2.5–4.9)
PLATELET # BLD AUTO: 187 K/UL (ref 135–420)
PMV BLD AUTO: 10.2 FL (ref 9.2–11.8)
POTASSIUM SERPL-SCNC: 4 MMOL/L (ref 3.5–5.5)
RBC # BLD AUTO: 3.94 M/UL (ref 4.2–5.3)
SODIUM SERPL-SCNC: 142 MMOL/L (ref 136–145)
WBC # BLD AUTO: 6.9 K/UL (ref 4.6–13.2)

## 2018-03-02 PROCEDURE — 83735 ASSAY OF MAGNESIUM: CPT | Performed by: COLON & RECTAL SURGERY

## 2018-03-02 PROCEDURE — 80048 BASIC METABOLIC PNL TOTAL CA: CPT | Performed by: COLON & RECTAL SURGERY

## 2018-03-02 PROCEDURE — 74011250637 HC RX REV CODE- 250/637: Performed by: COLON & RECTAL SURGERY

## 2018-03-02 PROCEDURE — 85027 COMPLETE CBC AUTOMATED: CPT | Performed by: COLON & RECTAL SURGERY

## 2018-03-02 PROCEDURE — 84100 ASSAY OF PHOSPHORUS: CPT | Performed by: COLON & RECTAL SURGERY

## 2018-03-02 PROCEDURE — 65270000029 HC RM PRIVATE

## 2018-03-02 PROCEDURE — 74011250636 HC RX REV CODE- 250/636: Performed by: COLON & RECTAL SURGERY

## 2018-03-02 PROCEDURE — 36415 COLL VENOUS BLD VENIPUNCTURE: CPT | Performed by: COLON & RECTAL SURGERY

## 2018-03-02 RX ORDER — OXYCODONE AND ACETAMINOPHEN 5; 325 MG/1; MG/1
1 TABLET ORAL
Qty: 40 TAB | Refills: 0 | Status: SHIPPED | OUTPATIENT
Start: 2018-03-02 | End: 2018-03-12 | Stop reason: ALTCHOICE

## 2018-03-02 RX ADMIN — FAMOTIDINE 20 MG: 20 TABLET, FILM COATED ORAL at 08:56

## 2018-03-02 RX ADMIN — HEPARIN SODIUM 5000 UNITS: 5000 INJECTION, SOLUTION INTRAVENOUS; SUBCUTANEOUS at 08:56

## 2018-03-02 RX ADMIN — KETOROLAC TROMETHAMINE 15 MG: 15 INJECTION, SOLUTION INTRAMUSCULAR; INTRAVENOUS at 07:34

## 2018-03-02 RX ADMIN — HEPARIN SODIUM 5000 UNITS: 5000 INJECTION, SOLUTION INTRAVENOUS; SUBCUTANEOUS at 23:24

## 2018-03-02 RX ADMIN — KETOROLAC TROMETHAMINE 15 MG: 15 INJECTION, SOLUTION INTRAMUSCULAR; INTRAVENOUS at 17:31

## 2018-03-02 RX ADMIN — ROSUVASTATIN CALCIUM 10 MG: 10 TABLET, FILM COATED ORAL at 21:19

## 2018-03-02 RX ADMIN — KETOROLAC TROMETHAMINE 15 MG: 15 INJECTION, SOLUTION INTRAMUSCULAR; INTRAVENOUS at 13:22

## 2018-03-02 RX ADMIN — DEXTROSE MONOHYDRATE, SODIUM CHLORIDE, AND POTASSIUM CHLORIDE 75 ML/HR: 50; 4.5; 1.49 INJECTION, SOLUTION INTRAVENOUS at 23:27

## 2018-03-02 RX ADMIN — HEPARIN SODIUM 5000 UNITS: 5000 INJECTION, SOLUTION INTRAVENOUS; SUBCUTANEOUS at 03:25

## 2018-03-02 RX ADMIN — FAMOTIDINE 20 MG: 20 TABLET, FILM COATED ORAL at 17:31

## 2018-03-02 RX ADMIN — KETOROLAC TROMETHAMINE 15 MG: 15 INJECTION, SOLUTION INTRAMUSCULAR; INTRAVENOUS at 23:24

## 2018-03-02 RX ADMIN — HEPARIN SODIUM 5000 UNITS: 5000 INJECTION, SOLUTION INTRAVENOUS; SUBCUTANEOUS at 17:31

## 2018-03-02 RX ADMIN — KETOROLAC TROMETHAMINE 15 MG: 15 INJECTION, SOLUTION INTRAMUSCULAR; INTRAVENOUS at 00:45

## 2018-03-02 RX ADMIN — ESCITALOPRAM OXALATE 10 MG: 10 TABLET ORAL at 08:56

## 2018-03-02 RX ADMIN — AMLODIPINE BESYLATE 10 MG: 10 TABLET ORAL at 08:56

## 2018-03-02 NOTE — ROUTINE PROCESS
Bedside and Verbal shift change report given to Anaid Heath RN (oncoming nurse) by Sagar Bernal RN (offgoing nurse). Report included the following information SBAR, Kardex, MAR and Recent Results. SITUATION:  Code Status: No Order  Reason for Admission: Malignant neoplasm of sigmoid colon ([facility] [address] [C18.7]  Hospital day: 2  Problem List:       Hospital Problems  Date Reviewed: 2/27/2018          Codes Class Noted POA    Colon cancer [facility]) ICD-10-CM: C18.9  ICD-9-CM: 153.9  2/27/2018 Unknown              BACKGROUND:   Past Medical History:   Past Medical History:   Diagnosis Date    Anxiety     Arthritis     psoriatic    High cholesterol     Hypertension     Sleep apnea     cpap      Patient taking anticoagulants yes    Patient has a defibrillator: no    History of shots NO for example, flu, pneumonia, tetanus   Isolation History NO for example, MRSA, CDiff    ASSESSMENT:  Changes in Assessment Throughout Shift:   Significant Changes in 24 hours (for example, RR/code, fall)  Patient has Central Line: no Reasons if yes:   Patient has Tony Cath: no Reasons if yes:     Mobility Issues  PT  IV Patency  OR Checklist  Pending Tests    Last Vitals:  Vitals w/ MEWS Score (last day)     Date/Time MEWS Score Pulse Resp Temp BP Level of Consciousness SpO2    03/01/18 2000 1 89 16 97.8 °F (36.6 °C) 153/89 Alert 93 %    03/01/18 1513 1 96 16 97.7 °F (36.5 °C) 152/85 Alert 92 %    03/01/18 1148 1 88 18 98.9 °F (37.2 °C) 148/86 Alert 93 %    03/01/18 0510 1 90 18 98.9 °F (37.2 °C) 145/69 Alert 92 %    02/28/18 2351 1 90 18 97.4 °F (36.3 °C) 137/81 Alert 90 %    02/28/18 1511 1 98 18 97.3 °F (36.3 °C) 125/77 Alert 90 %    02/28/18 1135 1 93 18 98.9 °F (37.2 °C) 116/67 Alert 90 %    02/28/18 0758 1 87 18 98 °F (36.7 °C) 117/75 Alert 93 %    02/28/18 0355 1 89 18 98.9 °F (37.2 °C) 129/79 Alert 93 %    02/28/18 0026 1 86 18 98.2 °F (36.8 °C) 117/75 Alert 92 %            PAIN    Pain Assessment    Pain Intensity 1: 0 (03/01/18 6517)    Pain Location 1: Abdomen    Pain Intervention(s) 1: Medication (see MAR)    Patient Stated Pain Goal: 0  Intervention effective: yes  Time of last intervention: 1830 Reassessment Completed: no   Other actions taken for pain: no    Last 3 Weights:  Last 3 Recorded Weights in this Encounter    02/23/18 1520 02/27/18 0645   Weight: 86.2 kg (190 lb) 84.8 kg (187 lb)   Weight change:     INTAKE/OUPUT    Current Shift:      Last three shifts: 02/28 0701 - 03/01 1900  In: 3945.8 [P.O.:1200; I.V.:2745.8]  Out: 2400 [Urine:2400]    RECOMMENDATIONS AND DISCHARGE PLANNING  Patient needs and requests:     Pending tests/procedures:      Discharge plan for patient:     Discharge planning Needs or Barriers:     Estimated Discharge Date: 3/3 Posted on Whiteboard in Patients Room: yes       \"HEALS\" SAFETY CHECK  A safety check occurred in the patient's room between off going nurse and oncoming nurse listed above. The safety check included the below items:    H  High Alert Medications Verify all high alert medication drips (heparin, PCA, etc.)  E  Equipment Suction is set up for ALL patients (with sydney)  Red plugs utilized for all equipment (IV pumps, etc.)  WOWs wiped down at end of shift. Room stocked with oxygen, suction, and other unit-specific supplies  A  Alarms Bed alarm is set for fall risk patients  Ensure chair alarm is in place and activated if patient is up in a chair  L  Lines Check IV for any infiltration  Tony bag is empty if patient has a Tony   Tubing and IV bags are labeled  S  Safety  Room is clean, patient is clean, and equipment is clean. Hallways are clear from equipment besides carts. Fall bracelet on for fall risk patients  Ensure room is clear and free of clutter  Suction is set up for ALL patients (with sydney)  Hallways are clear from equipment besides carts.    Isolation precautions followed, supplies available outside room, sign posted    Etelvina Nina RN

## 2018-03-02 NOTE — ROUTINE PROCESS
Bedside and Verbal shift change report given to Va Thurston Rn (oncoming nurse) by Poncho Karimi RN (offgoing nurse). Report included the following information SBAR, Kardex, MAR and Recent Results.     SITUATION:    Code Status: No Order   Reason for Admission: Malignant neoplasm of sigmoid colon ([facility] [address] [C18.7]    [facility] day: 3   Problem List:       Hospital Problems  Date Reviewed: 2018          Codes Class Noted POA    Colon cancer [facility]) ICD-10-CM: C18.9  ICD-9-CM: 153.9  2018 Unknown              BACKGROUND:    Past Medical History:   Past Medical History:   Diagnosis Date    Anxiety     Arthritis     psoriatic    High cholesterol     Hypertension     Sleep apnea     cpap         Patient taking anticoagulants no     ASSESSMENT:    Changes in Assessment Throughout Shift: no     Patient has Central Line: no Reasons if yes: no   Patient has Tony Cath: no Reasons if yes: no      Last Vitals:     Vitals:    18 0049 18 0500 18 0743   BP: 153/89 150/87 165/90 155/88   Pulse: 89 83 76 80   Resp: 16 15 15 14   Temp: 97.8 °F (36.6 °C) 98.6 °F (37 °C) 98.4 °F (36.9 °C) 98 °F (36.7 °C)   SpO2: 93% 92% 93% 97%   Weight:       Height:            IV and DRAINS (will only show if present)   Peripheral IV 18 Right Wrist-Site Assessment: Clean, dry, & intact  Peripheral IV 18 Left Wrist-Site Assessment: Clean, dry, & intact     WOUND (if present)   Wound Type:  none   Dressing present Dressing Present : No   Wound Concerns/Notes:  none     PAIN    Pain Assessment    Pain Intensity 1: 0 (18 0548)    Pain Location 1: Abdomen    Pain Intervention(s) 1: Medication (see MAR)    Patient Stated Pain Goal: 0  o Interventions for Pain:  none  o Intervention effective: no  o Time of last intervention:    o Reassessment Completed: no      Last 3 Weights:  Last 3 Recorded Weights in this Encounter    18 1520 18 0645   Weight: 86.2 kg (190 lb) 84.8 kg (187 lb)     Weight change:      INTAKE/OUPUT    Current Shift:      Last three shifts: 02/28 1901 - 03/02 0700  In: 3465.8 [P.O.:720; I.V.:2745.8]  Out: 1400 [Urine:1400]     LAB RESULTS     Recent Labs      03/02/18   0433  03/01/18   0338  02/28/18   0445   WBC  6.9  7.7  7.6   HGB  10.6*  10.3*  10.6*   HCT  33.5*  33.6*  33.1*   PLT  187  192  206        Recent Labs      03/02/18   0433  03/01/18   0338  02/28/18   0445   NA  142  142  141   K  4.0  3.9  4.0   GLU  131*  108*  129*   BUN  6*  6*  7   CREA  0.53*  0.53*  0.70   CA  9.2  9.3  8.5   MG  2.0  2.0  1.7       RECOMMENDATIONS AND DISCHARGE PLANNING     1. Pending tests/procedures/ Plan of Care or Other Needs: Mobility     2. Discharge plan for patient and Needs/Barriers: Home    3. Estimated Discharge Date: 3/3/18 Posted on Whiteboard in Patients Room: no      4. The patient's care plan was reviewed with the oncoming nurse. \"HEALS\" SAFETY CHECK      Fall Risk    Total Score: 2    Safety Measures: Safety Measures: Bed/Chair-Wheels locked, Bed in low position, Call light within reach, Caregiver at bedside    A safety check occurred in the patient's room between off going nurse and oncoming nurse listed above. The safety check included the below items  Area Items   H  High Alert Medications - Verify all high alert medication drips (heparin, PCA, etc.)   E  Equipment - Suction is set up for ALL patients (with yanker)  - Red plugs utilized for all equipment (IV pumps, etc.)  - WOWs wiped down at end of shift.  - Room stocked with oxygen, suction, and other unit-specific supplies   A  Alarms - Bed alarm is set for fall risk patients  - Ensure chair alarm is in place and activated if patient is up in a chair   L  Lines - Check IV for any infiltration  - Tony bag is empty if patient has a Tony   - Tubing and IV bags are labeled   S  Safety   - Room is clean, patient is clean, and equipment is clean.   - Hallways are clear from equipment besides carts.   - Fall bracelet on for fall risk patients  - Ensure room is clear and free of clutter  - Suction is set up for ALL patients (with sydney)  - Hallways are clear from equipment besides carts.    - Isolation precautions followed, supplies available outside room, sign posted     Anthony Ace RN

## 2018-03-02 NOTE — ROUTINE PROCESS
Bedside shift change report given to MARCELLUS Malone RN (oncoming nurse) by Kade Ferrara RN (offgoing nurse). Report included the following information SBAR, Kardex, Intake/Output, MAR, Recent Results and Med Rec Status.

## 2018-03-02 NOTE — DISCHARGE INSTRUCTIONS
Saiguohart Activation    Thank you for requesting access to SmartMove. Please follow the instructions below to securely access and download your online medical record. SmartMove allows you to send messages to your doctor, view your test results, renew your prescriptions, schedule appointments, and more. How Do I Sign Up? 1. In your internet browser, go to [email or web address]. 2. Click on the First Time User? Click Here link in the Sign In box. You will see the New Member Sign Up page. 3. Enter your SmartMove Access Code exactly as it appears below. You will not need to use this code after youve completed the sign-up process. If you do not sign up before the expiration date, you must request a new code. SmartMove Access Code: Activation code not generated  Current SmartMove Status: Patient Declined (This is the date your SmartMove access code will )    4. Enter the last four digits of your Social Security Number (xxxx) and Date of Birth (mm/dd/yyyy) as indicated and click Submit. You will be taken to the next sign-up page. 5. Create a SmartMove ID. This will be your SmartMove login ID and cannot be changed, so think of one that is secure and easy to remember. 6. Create a SmartMove password. You can change your password at any time. 7. Enter your Password Reset Question and Answer. This can be used at a later time if you forget your password. 8. Enter your e-mail address. You will receive e-mail notification when new information is available in [address] 9. Click Sign Up. You can now view and download portions of your medical record. 10. Click the Download Summary menu link to download a portable copy of your medical information. Additional Information    If you have questions, please visit the Frequently Asked Questions section of the SmartMove website at [email or web address] Remember, SmartMove is NOT to be used for urgent needs.  For medical emergencies, dial 911.      Patient armband removed and shredded    DISCHARGE SUMMARY from Nurse    PATIENT INSTRUCTIONS:    After general anesthesia or intravenous sedation, for 24 hours or while taking prescription Narcotics:  · Limit your activities  · Do not drive and operate hazardous machinery  · Do not make important personal or business decisions  · Do  not drink alcoholic beverages  · If you have not urinated within 8 hours after discharge, please contact your surgeon on call. Report the following to your surgeon:  · Excessive pain, swelling, redness or odor of or around the surgical area  · Temperature over 100.5  · Nausea and vomiting lasting longer than 4 hours or if unable to take medications  · Any signs of decreased circulation or nerve impairment to extremity: change in color, persistent  numbness, tingling, coldness or increase pain  · Any questions    What to do at Home:  Recommended activity: Activity as tolerated, No lifting, Driving, or Strenuous exercise until you see your surgeon, No heavy lifting, pushing, pulling and No driving while on analgesics    Please follow up with Dr. Tanika Fan as specify    *  Please give a list of your current medications to your Primary Care Provider. *  Please update this list whenever your medications are discontinued, doses are      changed, or new medications (including over-the-counter products) are added. *  Please carry medication information at all times in case of emergency situations. These are general instructions for a healthy lifestyle:    No smoking/ No tobacco products/ Avoid exposure to second hand smoke  Surgeon General's Warning:  Quitting smoking now greatly reduces serious risk to your health.     Obesity, smoking, and sedentary lifestyle greatly increases your risk for illness    A healthy diet, regular physical exercise & weight monitoring are important for maintaining a healthy lifestyle    You may be retaining fluid if you have a history of heart failure or if you experience any of the following symptoms:  Weight gain of 3 pounds or more overnight or 5 pounds in a week, increased swelling in our hands or feet or shortness of breath while lying flat in bed. Please call your doctor as soon as you notice any of these symptoms; do not wait until your next office visit. Recognize signs and symptoms of STROKE:    F-face looks uneven    A-arms unable to move or move unevenly    S-speech slurred or non-existent    T-time-call 911 as soon as signs and symptoms begin-DO NOT go       Back to bed or wait to see if you get better-TIME IS BRAIN. Warning Signs of HEART ATTACK     Call 911 if you have these symptoms:   Chest discomfort. Most heart attacks involve discomfort in the center of the chest that lasts more than a few minutes, or that goes away and comes back. It can feel like uncomfortable pressure, squeezing, fullness, or pain.  Discomfort in other areas of the upper body. Symptoms can include pain or discomfort in one or both arms, the back, neck, jaw, or stomach.  Shortness of breath with or without chest discomfort.  Other signs may include breaking out in a cold sweat, nausea, or lightheadedness. Don't wait more than five minutes to call 911 - MINUTES MATTER! Fast action can save your life. Calling 911 is almost always the fastest way to get lifesaving treatment. Emergency Medical Services staff can begin treatment when they arrive -- up to an hour sooner than if someone gets to the hospital by car. The discharge information has been reviewed with the patient and spouse. The patient and spouse verbalized understanding. Discharge medications reviewed with the patient and spouse and appropriate educational materials and side effects teaching were provided.   ___________________________________________________________________________________________________________________________________Instructions After Colectomy    No heavy lifting (nothing more than 10-15 lbs)    Low fiber diet - avoid raw fruits and vegetables, but small amounts of cooked vegetables are ok. Eat white bread products instead of wheat bread products. Meats, fish, chicken, noodles are ok. Showers over your wounds are ok, but avoid submerging wounds in pools or baths.  The strips over your wound will fall off on their own or I will remove them in the office    Make an office follow up appointment in 2 weeks

## 2018-03-02 NOTE — PROGRESS NOTES
[facility]  [address] [place] [address] [phone]  Colon and Rectal Surgery Progress Note      Patient: Arsh Grant MRN: [id number]  SSN: [id number]    YOB: 1960  Age: 61 y.o. Sex: female      Admit Date: 2/27/2018    LOS: 3 days     Subjective:     Pain well controlled. Passing flatus and bm. Mild bloating with clears. Objective:     Vitals:    03/01/18 2000 03/02/18 0049 03/02/18 0500 03/02/18 0743   BP: 153/89 150/87 165/90 155/88   Pulse: 89 83 76 80   Resp: 16 15 15 14   Temp: 97.8 °F (36.6 °C) 98.6 °F (37 °C) 98.4 °F (36.9 °C) 98 °F (36.7 °C)   SpO2: 93% 92% 93% 97%   Weight:       Height:            Intake and Output:  Current Shift:    Last three shifts: 02/28 1901 - 03/02 0700  In: 3465.8 [P.O.:720; I.V.:2745.8]  Out: 1400 [Urine:1400]    Physical Exam:   Constitutional: Appears well-developed and well-nourished. No distress. Cardiovascular: Normal rate, regular rhythm and normal heart sounds. Exam reveals no gallop and no friction rub. No murmur heard. Pulmonary/Chest: Effort normal and breath sounds normal. No respiratory distress. No wheezes, rales, or rhonchi. Abdominal: Soft. Bowel sounds are normal. There is no distension. Appropriately tender to palpation near incision sites. Abdominal incisions w/steri strips in place c/d/i. There is no rebound and no guarding.      Lab/Data Review:  Recent Results (from the past 12 hour(s))   METABOLIC PANEL, BASIC    Collection Time: 03/02/18  4:33 AM   Result Value Ref Range    Sodium 142 136 - 145 mmol/L    Potassium 4.0 3.5 - 5.5 mmol/L    Chloride 107 100 - 108 mmol/L    CO2 29 21 - 32 mmol/L    Anion gap 6 3.0 - 18 mmol/L    Glucose 131 (H) 74 - 99 mg/dL    BUN 6 (L) 7.0 - 18 MG/DL    Creatinine 0.53 (L) 0.6 - 1.3 MG/DL    BUN/Creatinine ratio 11 (L) 12 - 20      GFR est AA >60 >60 ml/min/1.73m2    GFR est non-AA >60 >60 ml/min/1.73m2    Calcium 9.2 8.5 - 10.1 MG/DL   CBC W/O DIFF    Collection Time: 03/02/18  4:33 AM   Result Value Ref Range    WBC 6.9 4.6 - 13.2 K/uL    RBC 3.94 (L) 4.20 - 5.30 M/uL    HGB 10.6 (L) 12.0 - 16.0 g/dL    HCT 33.5 (L) 35.0 - 45.0 %    MCV 85.0 74.0 - 97.0 FL    MCH 26.9 24.0 - 34.0 PG    MCHC 31.6 31.0 - 37.0 g/dL    RDW 14.4 11.6 - 14.5 %    PLATELET 069 306 - 078 K/uL    MPV 10.2 9.2 - 11.8 FL   MAGNESIUM    Collection Time: 03/02/18  4:33 AM   Result Value Ref Range    Magnesium 2.0 1.6 - 2.6 mg/dL   PHOSPHORUS    Collection Time: 03/02/18  4:33 AM   Result Value Ref Range    Phosphorus 2.2 (L) 2.5 - 4.9 MG/DL       Assessment:     POD #3 s/p robotic sigmoid colectomy, splenic flexure mobilization 2/2 adenocarcinoma of the sigmoid colon    Plan:     Solids for dinner  Possibly home tomorrow afternoon if tolerates 3 meals    Signed By: Rashid Chavarria MD    March 2, 2018

## 2018-03-03 VITALS
BODY MASS INDEX: 36.71 KG/M2 | TEMPERATURE: 97.9 F | DIASTOLIC BLOOD PRESSURE: 89 MMHG | HEART RATE: 81 BPM | SYSTOLIC BLOOD PRESSURE: 149 MMHG | WEIGHT: 187 LBS | HEIGHT: 60 IN | OXYGEN SATURATION: 97 % | RESPIRATION RATE: 19 BRPM

## 2018-03-03 LAB
ANION GAP SERPL CALC-SCNC: 5 MMOL/L (ref 3–18)
BUN SERPL-MCNC: 8 MG/DL (ref 7–18)
BUN/CREAT SERPL: 15 (ref 12–20)
CALCIUM SERPL-MCNC: 9 MG/DL (ref 8.5–10.1)
CHLORIDE SERPL-SCNC: 107 MMOL/L (ref 100–108)
CO2 SERPL-SCNC: 29 MMOL/L (ref 21–32)
CREAT SERPL-MCNC: 0.55 MG/DL (ref 0.6–1.3)
ERYTHROCYTE [DISTWIDTH] IN BLOOD BY AUTOMATED COUNT: 14.4 % (ref 11.6–14.5)
GLUCOSE SERPL-MCNC: 130 MG/DL (ref 74–99)
HCT VFR BLD AUTO: 33.8 % (ref 35–45)
HGB BLD-MCNC: 10.7 G/DL (ref 12–16)
MAGNESIUM SERPL-MCNC: 1.9 MG/DL (ref 1.6–2.6)
MCH RBC QN AUTO: 26.8 PG (ref 24–34)
MCHC RBC AUTO-ENTMCNC: 31.7 G/DL (ref 31–37)
MCV RBC AUTO: 84.7 FL (ref 74–97)
PHOSPHATE SERPL-MCNC: 3.3 MG/DL (ref 2.5–4.9)
PLATELET # BLD AUTO: 211 K/UL (ref 135–420)
PMV BLD AUTO: 10.6 FL (ref 9.2–11.8)
POTASSIUM SERPL-SCNC: 3.9 MMOL/L (ref 3.5–5.5)
RBC # BLD AUTO: 3.99 M/UL (ref 4.2–5.3)
SODIUM SERPL-SCNC: 141 MMOL/L (ref 136–145)
WBC # BLD AUTO: 6.3 K/UL (ref 4.6–13.2)

## 2018-03-03 PROCEDURE — 36415 COLL VENOUS BLD VENIPUNCTURE: CPT | Performed by: COLON & RECTAL SURGERY

## 2018-03-03 PROCEDURE — 85027 COMPLETE CBC AUTOMATED: CPT | Performed by: COLON & RECTAL SURGERY

## 2018-03-03 PROCEDURE — 74011250637 HC RX REV CODE- 250/637: Performed by: COLON & RECTAL SURGERY

## 2018-03-03 PROCEDURE — 84100 ASSAY OF PHOSPHORUS: CPT | Performed by: COLON & RECTAL SURGERY

## 2018-03-03 PROCEDURE — 80048 BASIC METABOLIC PNL TOTAL CA: CPT | Performed by: COLON & RECTAL SURGERY

## 2018-03-03 PROCEDURE — 83735 ASSAY OF MAGNESIUM: CPT | Performed by: COLON & RECTAL SURGERY

## 2018-03-03 PROCEDURE — 74011250636 HC RX REV CODE- 250/636: Performed by: COLON & RECTAL SURGERY

## 2018-03-03 RX ADMIN — ESCITALOPRAM OXALATE 10 MG: 10 TABLET ORAL at 08:39

## 2018-03-03 RX ADMIN — FAMOTIDINE 20 MG: 20 TABLET, FILM COATED ORAL at 08:39

## 2018-03-03 RX ADMIN — HEPARIN SODIUM 5000 UNITS: 5000 INJECTION, SOLUTION INTRAVENOUS; SUBCUTANEOUS at 08:39

## 2018-03-03 RX ADMIN — KETOROLAC TROMETHAMINE 15 MG: 15 INJECTION, SOLUTION INTRAMUSCULAR; INTRAVENOUS at 12:05

## 2018-03-03 RX ADMIN — KETOROLAC TROMETHAMINE 15 MG: 15 INJECTION, SOLUTION INTRAMUSCULAR; INTRAVENOUS at 05:32

## 2018-03-03 RX ADMIN — AMLODIPINE BESYLATE 10 MG: 10 TABLET ORAL at 08:45

## 2018-03-03 NOTE — PROGRESS NOTES
2120  Received pt in stable condition,   General: lying in bed in supine, not apparent distress, 0 pain, voiced no compliants at this time. Neuro: AOx4  Cardio:  denies chest pain  Respiratory: denies shortness of breath  Skin: Steri strips to abdomen clean, dry and intact  GI: voiding  : denies nausea and vomiting; last BM: 3/2/18  Mus: ambulates c assistance  Bed in low position and call bell within reach.

## 2018-03-03 NOTE — PROGRESS NOTES
Problem: Falls - Risk of  Goal: *Absence of Falls  Document Gita Fall Risk and appropriate interventions in the flowsheet.    Outcome: Progressing Towards Goal  Fall Risk Interventions:  Mobility Interventions: Patient to call before getting OOB         Medication Interventions: Patient to call before getting OOB, Teach patient to arise slowly    Elimination Interventions: Call light in reach, Patient to call for help with toileting needs

## 2018-03-03 NOTE — PROGRESS NOTES
Discharge instructions and RX given to patient and spouse, patients and spouse verbalized understanding.

## 2018-03-03 NOTE — PROGRESS NOTES
Progress Note    Patient: Julius Medrano MRN: [id number]  SSN: [id number]    YOB: 1960  Age: 61 y.o. Sex: female      Admit Date: 2018    4 Days Post-Op    Procedure:  Procedure(s):  ROBOTIC SIGMOID COLECTOMY  SIGMOIDOSCOPY FLEXIBLE    Subjective:     Patient has no new complaints. Tolerating PO, no N/V or burping, passing flatus with +BM this am, non-bloody. Pain control adequate. Objective:     Visit Vitals    /89 (BP 1 Location: Left arm, BP Patient Position: At rest)    Pulse 81    Temp 97.9 °F (36.6 °C)    Resp 19    Ht 5' (1.524 m)    Wt 84.8 kg (187 lb)    SpO2 97%    BMI 36.52 kg/m2       Temp (24hrs), Av.2 °F (36.8 °C), Min:97.8 °F (36.6 °C), Max:98.7 °F (37.1 °C)      Physical Exam:    General:  Alert, cooperative, no distress. Lungs:   Clear to auscultation bilaterally. Heart:  Regular rate and rhythm. Abdomen:   Soft, minimal/appropriate incisional tenderness. Incision c/d/i. Bowel sounds normal. No masses,  No organomegaly. Extremities: Extremities normal, atraumatic, no cyanosis or edema. No calf tenderness. Data Review: images and reports reviewed    Lab Review: All lab results for the last 24 hours reviewed. WBC nl, H/H stable    Assessment:     Hospital Problems  Date Reviewed: 2018          Codes Class Noted POA    Colon cancer [facility]) ICD-10-CM: C18.9  ICD-9-CM: 153.9  2018 Unknown              Plan/Recommendations/Medical Decision Making:     Continue present treatment. Reg diet as tolerated. D/C pt to home. F/U Dr. Soren Dewitt 1-2 weeks.     Signed By: Roma Brown MD     March 3, 2018

## 2018-03-03 NOTE — PROGRESS NOTES
Patients was asleep but easily awaken on verbal stimuli. CMS+ steri strips to abdomen dry and intact. No geovanni no s/s of sob or distress. noted.

## 2018-03-03 NOTE — ROUTINE PROCESS
Bedside and Verbal shift change report given to Jessenia Ortiz (oncoming nurse) by Latosha Gates RN (offgoing nurse). Report included the following information SBAR, Kardex, MAR and Recent Results.     SITUATION:    Code Status: No Order   Reason for Admission: Malignant neoplasm of sigmoid colon ([facility] [address] [C18.7]    [facility] day: 4   Problem List:       Hospital Problems  Date Reviewed: 2/27/2018          Codes Class Noted POA    Colon cancer [facility]) ICD-10-CM: C18.9  ICD-9-CM: 153.9  2/27/2018 Unknown              BACKGROUND:    Past Medical History:   Past Medical History:   Diagnosis Date    Anxiety     Arthritis     psoriatic    High cholesterol     Hypertension     Sleep apnea     cpap         Patient taking anticoagulants yes    ASSESSMENT:    Changes in Assessment Throughout Shift: no     Patient has Central Line: no Reasons if yes: no   Patient has Tony Cath: no Reasons if yes: no      Last Vitals:     Vitals:    03/02/18 1109 03/02/18 1639 03/03/18 0036 03/03/18 0544   BP: 154/89 157/84 131/83 144/80   Pulse: 86 92 72 80   Resp: 16 15  18   Temp: 98.5 °F (36.9 °C) 98.7 °F (37.1 °C) 98.6 °F (37 °C) 98 °F (36.7 °C)   SpO2: 96% 97% 93% 96%   Weight:       Height:            IV and DRAINS (will only show if present)   Peripheral IV 02/27/18 Right Wrist-Site Assessment: Clean, dry, & intact  Peripheral IV 02/27/18 Left Wrist-Site Assessment: Clean, dry, & intact     WOUND (if present)   Wound Type:  surgical   Dressing present yes   Wound Concerns/Notes:  none     PAIN    Pain Assessment    Pain Intensity 1: 0 (03/03/18 0535)    Pain Location 1: Abdomen    Pain Intervention(s) 1: Medication (see MAR)    Patient Stated Pain Goal: 0  o Interventions for Pain:  Medication   o Intervention effective: yes  o Time of last intervention: see mar  o Reassessment Completed: yes     Last 3 Weights:  Last 3 Recorded Weights in this Encounter    02/23/18 1520 02/27/18 0645   Weight: 86.2 kg (190 lb) 84.8 kg (187 lb) Weight change:      INTAKE/OUPUT    Current Shift:      Last three shifts: 03/01 0701 - 03/02 1900  In: 2551.3 [P.O.:1000; I.V.:1551.3]  Out: 250 [Urine:250]     LAB RESULTS     Recent Labs      03/02/18 0433  03/01/18   0338   WBC  6.9  7.7   HGB  10.6*  10.3*   HCT  33.5*  33.6*   PLT  187  192        Recent Labs      03/02/18 0433  03/01/18   0338   NA  142  142   K  4.0  3.9   GLU  131*  108*   BUN  6*  6*   CREA  0.53*  0.53*   CA  9.2  9.3   MG  2.0  2.0       RECOMMENDATIONS AND DISCHARGE PLANNING     1. Pending tests/procedures/ Plan of Care or Other Needs: labs, diet    2. Discharge plan for patient and Needs/Barriers: Home    3. Estimated Discharge Date: 3/3/18 Posted on Whiteboard in [address] [place] [address] Room: yes    4. The patient's care plan was reviewed with the oncoming nurse. \"HEALS\" SAFETY CHECK      Fall Risk    Total Score: 2    Safety Measures: Safety Measures: Bed/Chair-Wheels locked, Bed in low position, Call light within reach, Fall prevention (comment), Gripper socks    A safety check occurred in the patient's room between off going nurse and oncoming nurse listed above. The safety check included the below items  Area Items   H  High Alert Medications - Verify all high alert medication drips (heparin, PCA, etc.)   E  Equipment - Suction is set up for ALL patients (with yanker)  - Red plugs utilized for all equipment (IV pumps, etc.)  - WOWs wiped down at end of shift.  - Room stocked with oxygen, suction, and other unit-specific supplies   A  Alarms - Bed alarm is set for fall risk patients  - Ensure chair alarm is in place and activated if patient is up in a chair   L  Lines - Check IV for any infiltration  - Tony bag is empty if patient has a Tony   - Tubing and IV bags are labeled   S  Safety   - Room is clean, patient is clean, and equipment is clean. - Hallways are clear from equipment besides carts.    - Fall bracelet on for fall risk patients  - Ensure room is clear and free of clutter  - Suction is set up for ALL patients (with sydney)  - Hallways are clear from equipment besides carts.    - Isolation precautions followed, supplies available outside room, sign posted     Kira Mclean RN

## 2018-03-09 NOTE — DISCHARGE SUMMARY
Colon and Rectal Surgery Discharge Summary     Patient: Blake Beck MRN: [id number]  SSN: [id number]    YOB: 1960  Age: 61 y.o. Sex: female       Admit Date: 2/27/2018    Discharge Date: 3/3/2018     Admission Diagnoses: Malignant neoplasm of sigmoid colon [facility]) [C18.7]    Discharge Diagnoses: Clemente Palmaca neoplast of sigmoid colon    Procedure: Robotic Sigmoid Colectomy    Problem List as of 3/3/2018  Date Reviewed: 2/27/2018          Codes Class Noted - Resolved    Colon cancer [facility]) ICD-10-CM: C18.9  ICD-9-CM: 153.9  2/27/2018 - Present               Discharge Condition: Good    Hospital Course: To OR for above without complication. Diet advanced. Pt ambulated. Remained afebrile. Discharged with bowel function. Tony removed and pt voided. Consults: None    Significant Diagnostic Studies: None    Disposition: home    Discharge Medications:   Cannot display discharge medications since this patient is not currently admitted. Activity: No heavy lifting for 6 weeks  Diet: Regular Diet  Wound Care: None needed    Follow-up Appointments   Procedures    FOLLOW UP VISIT Appointment in: Two Weeks     Standing Status:   Standing     Number of Occurrences:   1     Order Specific Question:   Appointment in     Answer:    Two Weeks       Signed By: Josue Faria MD     March 9, 2018

## 2018-03-12 ENCOUNTER — OFFICE VISIT (OUTPATIENT)
Dept: SURGERY | Age: 58
End: 2018-03-12

## 2018-03-12 VITALS
HEART RATE: 86 BPM | WEIGHT: 198 LBS | BODY MASS INDEX: 38.87 KG/M2 | DIASTOLIC BLOOD PRESSURE: 80 MMHG | HEIGHT: 60 IN | RESPIRATION RATE: 18 BRPM | TEMPERATURE: 98 F | SYSTOLIC BLOOD PRESSURE: 140 MMHG

## 2018-03-12 DIAGNOSIS — C18.7 MALIGNANT NEOPLASM OF SIGMOID COLON (HCC): Primary | ICD-10-CM

## 2018-03-12 NOTE — PATIENT INSTRUCTIONS
As stool becomes thicker add fiber to diet and start fiber powder daily one adult dose such as citrucel or metamucil schedule mri of liver with central scheduling and have cea blood test done f/u office one month

## 2018-03-12 NOTE — MR AVS SNAPSHOT
[address] [facility] [address] [facility] 
[phone] Patient: Tiffany Kim MRN: [id number] GEGE:[birth year] Visit Information Date & Time Provider Department Dept. Phone Encounter #  
 3/12/2018  1:15 PM Leopold Edward, MD [facility] [phone] [id number] Your Appointments 4/9/2018 10:15 AM  
POST OP with Leopold Edward, MD [facility] ([address] [facility]) Appt Note: 1 month f/up    post op [address] [facility] Upcoming Health Maintenance Date Due Hepatitis C Screening 1960 Pneumococcal 19-64 Highest Risk (1 of 3 - PCV13) 5/12/1978 DTaP/Tdap/Td series (1 - Tdap) 5/12/1980 PAP AKA CERVICAL CYTOLOGY 5/12/1980 BREAST CANCER SCRN MAMMOGRAM 5/12/2009 FOBT Q 1 YEAR AGE 50-75 5/12/2009 Influenza Age 5 to Adult 8/1/2017 Allergies as of 3/12/2018  Review Complete On: 3/12/2018 By: Leopold Edward, MD  
 No Known Allergies Current Immunizations  Never Reviewed No immunizations on file. Not reviewed this visit You Were Diagnosed With   
  
 Codes Comments Malignant neoplasm of sigmoid colon ([facility] [address]    -  Primary ICD-10-CM: C18.7 ICD-9-CM: 153.3 Vitals BP Pulse Temp Resp Height(growth percentile) Weight(growth percentile) 140/80 86 98 °F (36.7 °C) 18 5' (1.524 m) 198 lb (89.8 kg) BMI OB Status Smoking Status 38.67 kg/m2 Menopause Former Smoker BMI and BSA Data Body Mass Index Body Surface Area  
 38.67 kg/m 2 1.95 m 2 Preferred Pharmacy Pharmacy Name Phone [id number] [address] [place] [address] Your Updated Medication List  
  
   
 This list is accurate as of 3/12/18  1:32 PM.  Always use your most recent med list. amLODIPine-benazepril 10-20 mg per capsule Commonly known as:  Román Fiona Take 1 Cap by mouth daily. diclofenac EC 75 mg EC tablet Commonly known as:  VOLTAREN  
75 mg.  
  
 escitalopram oxalate 10 mg tablet Commonly known as:  Vola  Take 10 mg by mouth daily. fish oil-dha-epa 1,200-144-216 mg Cap Take  by mouth. hydroCHLOROthiazide 12.5 mg tablet Commonly known as:  HYDRODIURIL Take 12.5 mg by mouth daily. rosuvastatin 10 mg tablet Commonly known as:  CRESTOR 10 mg. SUPER B COMPLEX PO Take  by mouth. VITAMIN B-12 500 mcg tablet Generic drug:  cyanocobalamin Take 500 mcg by mouth daily. VITAMIN D3 2,000 unit Tab Generic drug:  cholecalciferol (vitamin D3) Take  by mouth. To-Do List   
 03/12/2018 Lab:  CEA   
  
 03/12/2018 Imaging:  MRI ABD W CONT Patient Instructions As stool becomes thicker add fiber to diet and start fiber powder daily one adult dose such as citrucel or metamucil schedule mri of liver with central scheduling and have cea blood test done f/u office one month Please provide this summary of care documentation to your next provider. Your primary care clinician is listed as Fredy John. If you have any questions after today's visit, please call [phone].

## 2018-03-12 NOTE — PROGRESS NOTES
Subjective: She is tolerating diet moving her bowels twice per day. Her pain is resolved. Past medical history and ROS were reviewed and unchanged. Abdomen: Soft, nontender nondistended  Wounds healed, no hernia    Pathology consistent with stage II disease  2 small subcentimeter lesions in the liver on initial CT  No prior CEA    Assessment / Plan    Status post robotic sigmoid colectomy for stage II adenocarcinoma of the sigmoid colon, recovered  MRI to evaluate the liver lesion seen on CT  Check CEA for baseline  Follow-up in 1 month  No high risk factors, will not refer for chemotherapy        The diagnoses and plan were discussed with patient. All questions answered. Plan of care agreed to by all concerned.

## 2018-03-12 NOTE — LETTER
3/12/2018 1:33 PM 
 
Patient:  Akira Penny YOB: 1960 Date of Visit: 3/12/2018 Karla Lomax MD 
[address] [place] [address] [address] [facility] [address] [place] [address] VIA Facsimile: [phone] Moises Merrill MD 
[address]  [address] [place] [address] VIA Facsimile: [phone] Dear Jama Eid is seen here in follow-up after her recent robotic sigmoid colectomy for what turned out to be a stage II adenocarcinoma of the sigmoid colon. None of the 28 lymph nodes showed evidence of regional metastasis. She is tolerating a diet moving her bowels without difficulty. Her perioperative course was without complication and she was discharged on postoperative day 4. Initial CT imaging showed two subcentimeter lesions, these were likely cysts based on the radiology read but we will order an MRI to be sure. Because she has no high risk features on her pathology I will not refer her for chemotherapy. She will follow-up in the office in 1 month's time. Thank you very much for your referral of Ms. Akira Penny. If you have questions, please do not hesitate to call me. I look forward to following Ms. Lennon along with you and will keep you updated as to her progress. Sincerely, Rashid Chavarria MD

## 2018-03-13 ENCOUNTER — HOSPITAL ENCOUNTER (OUTPATIENT)
Dept: LAB | Age: 58
Discharge: HOME | End: 2018-03-13
Payer: COMMERCIAL

## 2018-03-13 ENCOUNTER — DOCUMENTATION ONLY (OUTPATIENT)
Dept: SURGERY | Age: 58
End: 2018-03-13

## 2018-03-13 DIAGNOSIS — C18.7 MALIGNANT NEOPLASM OF SIGMOID COLON (HCC): ICD-10-CM

## 2018-03-13 PROCEDURE — 82378 CARCINOEMBRYONIC ANTIGEN: CPT | Performed by: COLON & RECTAL SURGERY

## 2018-03-14 LAB — CEA SERPL-MCNC: 0.7 NG/ML

## 2018-04-02 ENCOUNTER — HOSPITAL ENCOUNTER (OUTPATIENT)
Age: 58
Discharge: HOME | End: 2018-04-02
Attending: COLON & RECTAL SURGERY
Payer: COMMERCIAL

## 2018-04-02 DIAGNOSIS — C18.7 MALIGNANT NEOPLASM OF SIGMOID COLON (HCC): ICD-10-CM

## 2018-04-02 LAB — CREAT UR-MCNC: 0.8 MG/DL (ref 0.6–1.3)

## 2018-04-02 PROCEDURE — 74011250636 HC RX REV CODE- 250/636: Performed by: COLON & RECTAL SURGERY

## 2018-04-02 PROCEDURE — A9585 GADOBUTROL INJECTION: HCPCS | Performed by: COLON & RECTAL SURGERY

## 2018-04-02 PROCEDURE — 82565 ASSAY OF CREATININE: CPT

## 2018-04-02 PROCEDURE — 74183 MRI ABD W/O CNTR FLWD CNTR: CPT

## 2018-04-02 RX ADMIN — GADOBUTROL 10 ML: 604.72 INJECTION INTRAVENOUS at 09:00

## 2018-04-09 ENCOUNTER — OFFICE VISIT (OUTPATIENT)
Dept: SURGERY | Age: 58
End: 2018-04-09

## 2018-04-09 VITALS
TEMPERATURE: 98.1 F | BODY MASS INDEX: 37.11 KG/M2 | WEIGHT: 189 LBS | RESPIRATION RATE: 17 BRPM | OXYGEN SATURATION: 96 % | HEIGHT: 60 IN | HEART RATE: 92 BPM

## 2018-04-09 DIAGNOSIS — C18.7 MALIGNANT NEOPLASM OF SIGMOID COLON (HCC): Primary | ICD-10-CM

## 2018-04-09 NOTE — MR AVS SNAPSHOT
[place] [address] [facility] 
[phone] Patient: Saniya Moise MRN: [id number] CQY:3/63/2619 Visit Information Date & Time Provider Department Dept. Phone Encounter #  
 4/9/2018 10:15 AM Bev Urrutia  [address] [id number] Your Appointments 7/9/2018  3:30 PM  
Follow Up with Bev Urrutia MD  
[facility] ([address]) Appt Note: 3 month f/up [address] [id number] [address] [facility] Upcoming Health Maintenance Date Due Hepatitis C Screening 1960 Pneumococcal 19-64 Highest Risk (1 of 3 - PCV13) 5/12/1978 DTaP/Tdap/Td series (1 - Tdap) 5/12/1980 PAP AKA CERVICAL CYTOLOGY 5/12/1980 BREAST CANCER SCRN MAMMOGRAM 5/12/2009 FOBT Q 1 YEAR AGE 50-75 5/12/2009 Influenza Age 5 to Adult 8/1/2017 Allergies as of 4/9/2018  Review Complete On: 3/12/2018 By: Bev Urrutia MD  
 No Known Allergies Current Immunizations  Never Reviewed No immunizations on file. Not reviewed this visit You Were Diagnosed With   
  
 Codes Comments Malignant neoplasm of sigmoid colon ([facility] [place] [address]    -  Primary ICD-10-CM: C18.7 ICD-9-CM: 153.3 Vitals Pulse Temp Resp Height(growth percentile) Weight(growth percentile) SpO2  
 92 98.1 °F (36.7 °C) (Oral) 17 5' (1.524 m) 189 lb (85.7 kg) 96% BMI OB Status Smoking Status 36.91 kg/m2 Menopause Former Smoker BMI and BSA Data Body Mass Index Body Surface Area  
 36.91 kg/m 2 1.9 m 2 Preferred Pharmacy Pharmacy Name Phone [facility], [address] [place] [address] Your Updated Medication List  
  
   
 This list is accurate as of 4/9/18 10:28 AM.  Always use your most recent med list. amLODIPine-benazepril 10-20 mg per capsule Commonly known as:  Nyla Novel Take 1 Cap by mouth daily. diclofenac EC 75 mg EC tablet Commonly known as:  VOLTAREN  
75 mg.  
  
 escitalopram oxalate 10 mg tablet Commonly known as:  Gisela Citron Take 10 mg by mouth daily. fish oil-dha-epa 1,200-144-216 mg Cap Take  by mouth. hydroCHLOROthiazide 12.5 mg tablet Commonly known as:  HYDRODIURIL Take 12.5 mg by mouth daily. rosuvastatin 10 mg tablet Commonly known as:  CRESTOR 10 mg. SUPER B COMPLEX PO Take  by mouth. VITAMIN B-12 500 mcg tablet Generic drug:  cyanocobalamin Take 500 mcg by mouth daily. VITAMIN D3 2,000 unit Tab Generic drug:  cholecalciferol (vitamin D3) Take  by mouth. Please provide this summary of care documentation to your next provider. Your primary care clinician is listed as Terry Montaño. If you have any questions after today's visit, please call [phone].

## 2018-04-09 NOTE — PROGRESS NOTES
Subjective: She is tolerating diet. She is moving her bowels 3 times a day. Past medical history and ROS were reviewed and unchanged. 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 The diagnoses and plan were discussed with patient. All questions answered. Plan of care agreed to by all concerned.

## 2018-04-09 NOTE — PROGRESS NOTES
1. Have you been to the ER, urgent care clinic since your last visit? Hospitalized since your last visit? No    2. Have you seen or consulted any other health care providers outside of the [facility] since your last visit? Include any pap smears or colon screening.  No

## 2018-07-09 ENCOUNTER — OFFICE VISIT (OUTPATIENT)
Dept: SURGERY | Age: 58
End: 2018-07-09

## 2018-07-09 ENCOUNTER — HOSPITAL ENCOUNTER (OUTPATIENT)
Dept: LAB | Age: 58
Discharge: HOME | End: 2018-07-09
Payer: COMMERCIAL

## 2018-07-09 VITALS
HEART RATE: 79 BPM | WEIGHT: 194 LBS | DIASTOLIC BLOOD PRESSURE: 80 MMHG | BODY MASS INDEX: 37.89 KG/M2 | TEMPERATURE: 98.4 F | RESPIRATION RATE: 20 BRPM | SYSTOLIC BLOOD PRESSURE: 128 MMHG

## 2018-07-09 DIAGNOSIS — C18.7 MALIGNANT NEOPLASM OF SIGMOID COLON (HCC): ICD-10-CM

## 2018-07-09 DIAGNOSIS — C18.7 MALIGNANT NEOPLASM OF SIGMOID COLON (HCC): Primary | ICD-10-CM

## 2018-07-09 DIAGNOSIS — R91.1 LUNG NODULE: ICD-10-CM

## 2018-07-09 DIAGNOSIS — K76.89 LIVER NODULE: ICD-10-CM

## 2018-07-09 PROCEDURE — 82378 CARCINOEMBRYONIC ANTIGEN: CPT | Performed by: COLON & RECTAL SURGERY

## 2018-07-09 PROCEDURE — 36415 COLL VENOUS BLD VENIPUNCTURE: CPT | Performed by: COLON & RECTAL SURGERY

## 2018-07-09 NOTE — MR AVS SNAPSHOT
[facility] [address] [facility] 
[phone] Patient: Sixto Will MRN: [id number] OCP:6/85/8960 Visit Information Date & Time Provider Department Dept. Phone Encounter #  
 7/9/2018  3:30 PM [facility]  Upcoming Health Maintenance Date Due Hepatitis C Screening 1960 Pneumococcal 19-64 Highest Risk (1 of 3 - PCV13) 5/12/1978 DTaP/Tdap/Td series (1 - Tdap) 5/12/1980 PAP AKA CERVICAL CYTOLOGY 5/12/1980 BREAST CANCER SCRN MAMMOGRAM 5/12/2009 FOBT Q 1 YEAR AGE 50-75 5/12/2009 Influenza Age 5 to Adult 8/1/2018 Allergies as of 7/9/2018  Review Complete On: 7/9/2018 By: Caleb Andres MD  
 No Known Allergies Current Immunizations  Never Reviewed No immunizations on file. Not reviewed this visit You Were Diagnosed With   
  
 Codes Comments Malignant neoplasm of sigmoid colon ([facility] [place] [address]    -  Primary ICD-10-CM: C18.7 ICD-9-CM: 153.3 Vitals BP Pulse Temp Resp Weight(growth percentile) BMI  
 128/80 79 98.4 °F (36.9 °C) 20 194 lb (88 kg) 37.89 kg/m2 OB Status Smoking Status Menopause Former Smoker Vitals History BMI and BSA Data Body Mass Index Body Surface Area  
 37.89 kg/m 2 1.93 m 2 Preferred Pharmacy Pharmacy Name Phone [facility], [address] [place] [address] Your Updated Medication List  
  
   
This list is accurate as of 7/9/18  4:14 PM.  Always use your most recent med list. amLODIPine-benazepril 10-20 mg per capsule Commonly known as:  Anshu Ba Take 1 Cap by mouth daily. diclofenac EC 75 mg EC tablet Commonly known as:  VOLTAREN  
75 mg.  
  
 escitalopram oxalate 10 mg tablet Commonly known as:  Mir Sigala Take 10 mg by mouth daily. fish oil-dha-epa 1,200-144-216 mg Cap Take  by mouth.  
  
 gabapentin 300 mg capsule Commonly known as:  NEURONTIN  
TAKE 1 CAP BY MOUTH EVERY NIGHT AT BEDTIME  
  
 * hydroCHLOROthiazide 12.5 mg tablet Commonly known as:  HYDRODIURIL Take 12.5 mg by mouth daily. * hydroCHLOROthiazide 12.5 mg capsule Commonly known as:  Carol Mulberry TAKE 1 CAP BY MOUTH ONCE A DAY. omega 3-dha-epa-fish oil 60- mg Cap  
500 mg.  
  
 rosuvastatin 10 mg tablet Commonly known as:  CRESTOR 10 mg. VITAMIN B-12 500 mcg tablet Generic drug:  cyanocobalamin Take 500 mcg by mouth daily. * VITAMIN D3 2,000 unit Tab Generic drug:  cholecalciferol (vitamin D3) Take  by mouth. * Cholecalciferol (Vitamin D3) 2,000 unit Cap capsule Commonly known as:  VITAMIN D3  
TAKE 1 CAP BY MOUTH ONCE A DAY. * Notice: This list has 4 medication(s) that are the same as other medications prescribed for you. Read the directions carefully, and ask your doctor or other care provider to review them with you. Patient Instructions Return in 3 months,  
cea test done now Ct scan in august  
 
 
  
Introducing [facility] & [facility] SERVICES! Dear Lesli Turner: Thank you for requesting a [email or web address] account. Our records indicate that you already have an active [email or web address] account. You can access your account anytime at [email or web address]/[email or web address] Did you know that you can access your hospital and ER discharge instructions at any time in [email or web address]? You can also review all of your test results from your hospital stay or ER visit. Additional Information If you have questions, please visit the Frequently Asked Questions section of the [email or web address] website at [email or web address] Remember, [email or web address] is NOT to be used for urgent needs. For medical emergencies, dial 911. Now available from your iPhone and Android! Please provide this summary of care documentation to your next provider. Your primary care clinician is listed as Cherelle Luu. If you have any questions after today's visit, please call [phone].

## 2018-07-09 NOTE — PROGRESS NOTES
Subjective: She is tolerating diet moving her bowels. Past medical history and ROS were reviewed and unchanged. Abdomen: Soft, nontender nondistended    CEA 3/18 0.7  CT C/A/P 2/18 4 mm lung nodule, 4&8 mm liver nodules  MRI abdomen 4/18 possible IPMN pancreas, rec repeat in 1 year     Assessment / Plan      Status post robotic sigmoid colectomy for T2N0 adenocarcinoma of the sigmoid colon, 2/18, ARCENIO  Repeat CEA  Repeat CT's in August for liver and lung nodules  F/U 3 months    A total of 15 minutes was spent with the patient, with >50% of time spent on counseling and coordination of care. The diagnoses and plan were discussed with patient. All questions answered. Plan of care agreed to by all concerned.

## 2018-07-10 LAB — CEA SERPL-MCNC: 0.8 NG/ML

## 2018-08-13 ENCOUNTER — HOSPITAL ENCOUNTER (OUTPATIENT)
Dept: CT IMAGING | Age: 58
Discharge: HOME | End: 2018-08-13
Attending: COLON & RECTAL SURGERY
Payer: COMMERCIAL

## 2018-08-13 DIAGNOSIS — K76.89 LIVER NODULE: ICD-10-CM

## 2018-08-13 DIAGNOSIS — C18.7 MALIGNANT NEOPLASM OF SIGMOID COLON (HCC): ICD-10-CM

## 2018-08-13 DIAGNOSIS — R91.1 LUNG NODULE: ICD-10-CM

## 2018-08-13 LAB — CREAT UR-MCNC: 0.6 MG/DL (ref 0.6–1.3)

## 2018-08-13 PROCEDURE — 82565 ASSAY OF CREATININE: CPT

## 2018-08-13 PROCEDURE — 74011636320 HC RX REV CODE- 636/320: Performed by: COLON & RECTAL SURGERY

## 2018-08-13 PROCEDURE — 74177 CT ABD & PELVIS W/CONTRAST: CPT

## 2018-08-13 RX ADMIN — IOPAMIDOL 100 ML: 612 INJECTION, SOLUTION INTRAVENOUS at 09:00

## 2018-10-11 ENCOUNTER — OFFICE VISIT (OUTPATIENT)
Dept: SURGERY | Age: 58
End: 2018-10-11

## 2018-10-11 VITALS
HEIGHT: 60 IN | WEIGHT: 190 LBS | HEART RATE: 74 BPM | BODY MASS INDEX: 37.3 KG/M2 | DIASTOLIC BLOOD PRESSURE: 82 MMHG | SYSTOLIC BLOOD PRESSURE: 138 MMHG | TEMPERATURE: 98.3 F | RESPIRATION RATE: 17 BRPM

## 2018-10-11 DIAGNOSIS — Z08 ENCOUNTER FOR ROUTINE CANCER FOLLOW-UP: Primary | ICD-10-CM

## 2018-10-11 DIAGNOSIS — Z85.00 PERSONAL HISTORY OF MALIGNANT NEOPLASM OF GASTROINTESTINAL TRACT: ICD-10-CM

## 2018-10-11 NOTE — PROGRESS NOTES
Subjective: Tolerating diet moving her bowels. Past medical history and ROS were reviewed and unchanged. Abdomen: Soft, nontender nondistended    CEA 7/18 0.8  CT C/A/P 8/18 liver cyst unchanged  MRI abdomen 4/18 possible IPMN pancreas, rec repeat in 1 year      Assessment / Plan      Status post robotic sigmoid colectomy for T2N0 adenocarcinoma of the sigmoid colon, 2/18, ARCENIO  Repeat CEA  Patient asks about PET/CT, I think given the MRI report which does not favor metastatic disease and the lack of progression of the liver cyst that we can manage this expectantly  She is having colonoscopy shortly with Dr. Michelle Luis with me in 4 months    A total of 15 minutes was spent with the patient, with >50% of time spent on counseling and coordination of care. The diagnoses and plan were discussed with patient. All questions answered. Plan of care agreed to by all concerned.

## 2018-10-11 NOTE — MR AVS SNAPSHOT
[place] [address] [facility] 
[phone] Patient: Gretchen Lamb MRN: [id number] KMX:[birth year] Visit Information Date & Time Provider Department Dept. Phone Encounter #  
 10/11/2018  3:45 PM MD ROXANA Joy[facility] [phone] [id number] Follow-up Instructions Return in about 4 months (around 2/11/2019). Follow-up and Disposition History Your Appointments 1/14/2019  3:30 PM  
Follow Up with MD ROXANA Joya [facility] ([address]) Appt Note: 4 month f/up [address] [id number] [address] [facility] [address] Upcoming Health Maintenance Date Due Hepatitis C Screening 1960 Pneumococcal 19-64 Highest Risk (1 of 3 - PCV13) 5/12/1978 DTaP/Tdap/Td series (1 - Tdap) 5/12/1980 PAP AKA CERVICAL CYTOLOGY 5/12/1980 Shingrix Vaccine Age 50> (1 of 2) 5/12/2009 BREAST CANCER SCRN MAMMOGRAM 5/12/2009 FOBT Q 1 YEAR AGE 50-75 5/12/2009 Influenza Age 5 to Adult 8/1/2018 Allergies as of 10/11/2018  Review Complete On: 10/11/2018 By: Lizeth Epps MD  
 No Known Allergies Current Immunizations  Never Reviewed No immunizations on file. Not reviewed this visit You Were Diagnosed With   
  
 Codes Comments Encounter for routine cancer follow-up    -  Primary ICD-10-CM: R58 ICD-9-CM: V67.59 Personal history of malignant neoplasm of gastrointestinal tract     ICD-10-CM: Z85.00 ICD-9-CM: V10.00 Vitals BP Pulse Temp Resp Height(growth percentile) Weight(growth percentile) 138/82 74 98.3 °F (36.8 °C) (Oral) 17 5' (1.524 m) 190 lb (86.2 kg) BMI OB Status Smoking Status 37.11 kg/m2 Menopause Former Smoker Vitals History BMI and BSA Data Body Mass Index Body Surface Area  
 37.11 kg/m 2 1.91 m 2 Preferred Pharmacy Pharmacy Name Phone [facility], [address] [place] [address] Your Updated Medication List  
  
   
This list is accurate as of 10/11/18  4:15 PM.  Always use your most recent med list. amLODIPine-benazepril 10-20 mg per capsule Commonly known as:  Ewgabriela Deonte Take 1 Cap by mouth daily. diclofenac EC 75 mg EC tablet Commonly known as:  VOLTAREN  
75 mg.  
  
 escitalopram oxalate 10 mg tablet Commonly known as:  Landry Penning Take 10 mg by mouth daily. fish oil-dha-epa 1,200-144-216 mg Cap Take  by mouth.  
  
 gabapentin 300 mg capsule Commonly known as:  NEURONTIN  
TAKE 1 CAP BY MOUTH EVERY NIGHT AT BEDTIME  
  
 * hydroCHLOROthiazide 12.5 mg tablet Commonly known as:  HYDRODIURIL Take 12.5 mg by mouth daily. * hydroCHLOROthiazide 12.5 mg capsule Commonly known as:  John Zeina TAKE 1 CAP BY MOUTH ONCE A DAY. omega 3-dha-epa-fish oil 60- mg Cap  
500 mg.  
  
 rosuvastatin 10 mg tablet Commonly known as:  CRESTOR 10 mg. VITAMIN B-12 500 mcg tablet Generic drug:  cyanocobalamin Take 500 mcg by mouth daily. * VITAMIN D3 2,000 unit Tab Generic drug:  cholecalciferol (vitamin D3) Take  by mouth. * Cholecalciferol (Vitamin D3) 2,000 unit Cap capsule Commonly known as:  VITAMIN D3  
TAKE 1 CAP BY MOUTH ONCE A DAY. * Notice: This list has 4 medication(s) that are the same as other medications prescribed for you. Read the directions carefully, and ask your doctor or other care provider to review them with you. Follow-up Instructions Return in about 4 months (around 2/11/2019). To-Do List   
 10/11/2018 Lab:  CEA Introducing [facility] & HEALTH SERVICES! Dear Vinetta Gowers: Thank you for requesting a SpreadShout account.   Our records indicate that you already have an active Innovate/Protect account. You can access your account anytime at [email or web address]. [employer]/Raise Marketplace Did you know that you can access your hospital and ER discharge instructions at any time in Innovate/Protect? You can also review all of your test results from your hospital stay or ER visit. Additional Information If you have questions, please visit the Frequently Asked Questions section of the Innov[employer]/Protect website at [email or web address] [email or web address]/. Remember, Innovate/Protect is NOT to be used for urgent needs. For medical emergencies, dial 911. Now available from your iPhone and Android! Please provide this summary of care documentation to your next provider. Your primary care clinician is listed as Reji Fung. If you have any questions after today's visit, please call [phone].

## 2018-10-22 ENCOUNTER — HOSPITAL ENCOUNTER (OUTPATIENT)
Dept: LAB | Age: 58
Discharge: HOME | End: 2018-10-22
Payer: COMMERCIAL

## 2018-10-22 DIAGNOSIS — Z85.00 PERSONAL HISTORY OF MALIGNANT NEOPLASM OF GASTROINTESTINAL TRACT: ICD-10-CM

## 2018-10-22 PROCEDURE — 36415 COLL VENOUS BLD VENIPUNCTURE: CPT | Performed by: COLON & RECTAL SURGERY

## 2018-10-22 PROCEDURE — 82378 CARCINOEMBRYONIC ANTIGEN: CPT | Performed by: COLON & RECTAL SURGERY

## 2018-10-23 LAB — CEA SERPL-MCNC: 0.8 NG/ML

## 2019-01-14 ENCOUNTER — OFFICE VISIT (OUTPATIENT)
Dept: SURGERY | Age: 59
End: 2019-01-14

## 2019-01-14 ENCOUNTER — HOSPITAL ENCOUNTER (OUTPATIENT)
Dept: LAB | Age: 59
Discharge: HOME | End: 2019-01-14
Payer: COMMERCIAL

## 2019-01-14 VITALS
HEART RATE: 84 BPM | BODY MASS INDEX: 37.5 KG/M2 | HEIGHT: 60 IN | TEMPERATURE: 97.5 F | RESPIRATION RATE: 17 BRPM | OXYGEN SATURATION: 97 % | WEIGHT: 191 LBS

## 2019-01-14 DIAGNOSIS — Z85.038 PERSONAL HISTORY OF COLON CANCER: ICD-10-CM

## 2019-01-14 DIAGNOSIS — Z08 ENCOUNTER FOR ROUTINE CANCER FOLLOW-UP: Primary | ICD-10-CM

## 2019-01-14 PROBLEM — E66.01 SEVERE OBESITY (HCC): Status: ACTIVE | Noted: 2019-01-14

## 2019-01-14 PROCEDURE — 36415 COLL VENOUS BLD VENIPUNCTURE: CPT

## 2019-01-14 PROCEDURE — 82378 CARCINOEMBRYONIC ANTIGEN: CPT

## 2019-01-14 NOTE — PROGRESS NOTES
1. Have you been to the ER, urgent care clinic since your last visit? Hospitalized since your last visit? No 
 
2. Have you seen or consulted any other health care providers outside of the [address] [place] [address] since your last visit? Include any pap smears or colon screening. Yes When: Dr. Alf Arnold, Dr. Frantz Rubi and your rheumatologist  
 
Patient feels down sometimes and feels thankful that she is cancer free. But wonders why other people aren't and that makes her sad.

## 2019-01-14 NOTE — PROGRESS NOTES
Subjective: Tolerating diet. Moving her bowels 3 times per day. Taking fiber powder on occasion. Weight is stable. Past medical history and ROS were reviewed and unchanged. Abdomen: Soft, nontender nondistended Wound healed no hernia CEA 10/18 0.8 CT C/A/P 8/18 liver cyst unchanged MRI abdomen 4/18 possible IPMN pancreas, rec repeat in 1 year Colonoscopy 10/2018 by Martina Mata, kandi, 3 year recall 
   
Assessment / Plan Status post robotic sigmoid colectomy for T2N0 adenocarcinoma of the sigmoid colon, 2/18, ARCENIO Repeat CEA 
F/U 3 months A total of 15 minutes was spent with the patient, with >50% of time spent on counseling and coordination of care. The diagnoses and plan were discussed with patient. All questions answered. Plan of care agreed to by all concerned.

## 2019-01-15 LAB — CEA SERPL-MCNC: 0.8 NG/ML

## 2019-04-22 ENCOUNTER — OFFICE VISIT (OUTPATIENT)
Dept: SURGERY | Age: 59
End: 2019-04-22

## 2019-04-22 ENCOUNTER — HOSPITAL ENCOUNTER (OUTPATIENT)
Dept: LAB | Age: 59
Discharge: HOME | End: 2019-04-22
Payer: COMMERCIAL

## 2019-04-22 VITALS
HEART RATE: 77 BPM | BODY MASS INDEX: 33.77 KG/M2 | RESPIRATION RATE: 18 BRPM | HEIGHT: 60 IN | WEIGHT: 172 LBS | DIASTOLIC BLOOD PRESSURE: 84 MMHG | TEMPERATURE: 97.2 F | SYSTOLIC BLOOD PRESSURE: 130 MMHG | OXYGEN SATURATION: 98 %

## 2019-04-22 DIAGNOSIS — Z08 ENCOUNTER FOR ROUTINE CANCER FOLLOW-UP: Primary | ICD-10-CM

## 2019-04-22 DIAGNOSIS — K86.2 PANCREATIC CYST: ICD-10-CM

## 2019-04-22 DIAGNOSIS — Z85.038 PERSONAL HISTORY OF COLON CANCER: ICD-10-CM

## 2019-04-22 PROCEDURE — 36415 COLL VENOUS BLD VENIPUNCTURE: CPT

## 2019-04-22 PROCEDURE — 82378 CARCINOEMBRYONIC ANTIGEN: CPT

## 2019-04-22 NOTE — PROGRESS NOTES
Subjective: Tolerating a diet and moving her bowels. Weight is slightly down but she has been dieting. Past medical history and ROS were reviewed and unchanged. Abdomen: Soft, nontender nondistended  With healed, no hernias    CEA 1/2019 is 0.8  CT C/A/P 8/18 liver cyst unchanged  MRI abdomen 4/18 possible IPMN pancreas, rec repeat in 1 year  Colonoscopy 10/2018 by kandi Orr, 3 year recall      Assessment / Plan  Status post robotic sigmoid colectomy for T2N0 adenocarcinoma of the sigmoid colon, 2/18, ARCENIO  Repeat CEA  MRI abdomen to f/u pancreatic lesion  F/U 4 months    A total of 15 minutes was spent with the patient, with >50% of time spent on counseling and coordination of care. The diagnoses and plan were discussed with patient. All questions answered. Plan of care agreed to by all concerned.

## 2019-04-23 LAB — CEA SERPL-MCNC: 0.7 NG/ML

## 2019-05-02 ENCOUNTER — HOSPITAL ENCOUNTER (OUTPATIENT)
Age: 59
Discharge: HOME | End: 2019-05-02
Attending: COLON & RECTAL SURGERY
Payer: COMMERCIAL

## 2019-05-02 DIAGNOSIS — K86.2 PANCREATIC CYST: ICD-10-CM

## 2019-05-02 LAB — CREAT UR-MCNC: 0.7 MG/DL (ref 0.6–1.3)

## 2019-05-02 PROCEDURE — A9577 INJ MULTIHANCE: HCPCS | Performed by: COLON & RECTAL SURGERY

## 2019-05-02 PROCEDURE — 82565 ASSAY OF CREATININE: CPT

## 2019-05-02 PROCEDURE — 74183 MRI ABD W/O CNTR FLWD CNTR: CPT

## 2019-05-02 PROCEDURE — 74011250636 HC RX REV CODE- 250/636: Performed by: COLON & RECTAL SURGERY

## 2019-05-02 RX ADMIN — GADOBENATE DIMEGLUMINE 20 ML: 529 INJECTION, SOLUTION INTRAVENOUS at 13:15

## 2019-08-26 ENCOUNTER — OFFICE VISIT (OUTPATIENT)
Dept: SURGERY | Age: 59
End: 2019-08-26

## 2019-08-26 VITALS
WEIGHT: 172 LBS | HEIGHT: 60 IN | SYSTOLIC BLOOD PRESSURE: 120 MMHG | BODY MASS INDEX: 33.77 KG/M2 | TEMPERATURE: 97.4 F | OXYGEN SATURATION: 99 % | RESPIRATION RATE: 17 BRPM | DIASTOLIC BLOOD PRESSURE: 73 MMHG | HEART RATE: 67 BPM

## 2019-08-26 DIAGNOSIS — Z85.038 PERSONAL HISTORY OF COLON CANCER: Primary | ICD-10-CM

## 2019-08-26 DIAGNOSIS — Z08 ENCOUNTER FOR ROUTINE CANCER FOLLOW-UP: ICD-10-CM

## 2019-08-26 PROBLEM — C18.9 COLON CANCER (HCC): Status: RESOLVED | Noted: 2018-02-27 | Resolved: 2019-08-26

## 2019-08-26 NOTE — PROGRESS NOTES
Subjective: Tolerating diet. Moving her bowels 3 times a day. Weight stable. Past medical history and ROS were reviewed and unchanged. Abdomen: Soft, nontender nondistended  Wounds healed, no hernia    CEA 4/2019 is 0.7  CT C/A/P 8/18 liver cyst unchanged  MRI abdomen 4/18 possible IPMN pancreas, rec repeat in 1 year, 4/2019 no change  Colonoscopy 10/2018 by Dominga, kandi, 3 year recall      Assessment / Plan  Status post robotic sigmoid colectomy for T2N0 adenocarcinoma of the sigmoid colon, 2/18, ARCENIO  Repeat CEA  Repeat CT C/A/P  F/U 4 months    A total of 15 minutes was spent with the patient, with >50% of time spent on counseling and coordination of care. The diagnoses and plan were discussed with patient. All questions answered. Plan of care agreed to by all concerned.

## 2019-09-06 ENCOUNTER — HOSPITAL ENCOUNTER (OUTPATIENT)
Dept: CT IMAGING | Age: 59
Discharge: HOME | End: 2019-09-06
Attending: COLON & RECTAL SURGERY
Payer: COMMERCIAL

## 2019-09-06 ENCOUNTER — HOSPITAL ENCOUNTER (OUTPATIENT)
Dept: LAB | Age: 59
Discharge: HOME | End: 2019-09-06
Attending: COLON & RECTAL SURGERY
Payer: COMMERCIAL

## 2019-09-06 DIAGNOSIS — Z85.038 PERSONAL HISTORY OF COLON CANCER: ICD-10-CM

## 2019-09-06 LAB — CREAT UR-MCNC: 0.7 MG/DL (ref 0.6–1.3)

## 2019-09-06 PROCEDURE — 74177 CT ABD & PELVIS W/CONTRAST: CPT

## 2019-09-06 PROCEDURE — 82378 CARCINOEMBRYONIC ANTIGEN: CPT

## 2019-09-06 PROCEDURE — 74011636320 HC RX REV CODE- 636/320: Performed by: COLON & RECTAL SURGERY

## 2019-09-06 PROCEDURE — 36415 COLL VENOUS BLD VENIPUNCTURE: CPT

## 2019-09-06 PROCEDURE — 82565 ASSAY OF CREATININE: CPT

## 2019-09-06 RX ADMIN — IOPAMIDOL 98 ML: 612 INJECTION, SOLUTION INTRAVENOUS at 10:00

## 2019-09-07 LAB — CEA SERPL-MCNC: 0.8 NG/ML

## 2019-09-10 ENCOUNTER — TELEPHONE (OUTPATIENT)
Dept: SURGERY | Age: 59
End: 2019-09-10

## 2019-12-23 ENCOUNTER — OFFICE VISIT (OUTPATIENT)
Dept: SURGERY | Age: 59
End: 2019-12-23

## 2019-12-23 VITALS
BODY MASS INDEX: 35.34 KG/M2 | WEIGHT: 180 LBS | DIASTOLIC BLOOD PRESSURE: 68 MMHG | TEMPERATURE: 96.5 F | HEART RATE: 68 BPM | RESPIRATION RATE: 18 BRPM | OXYGEN SATURATION: 97 % | SYSTOLIC BLOOD PRESSURE: 124 MMHG | HEIGHT: 60 IN

## 2019-12-23 DIAGNOSIS — Z08 ENCOUNTER FOR ROUTINE CANCER FOLLOW-UP: Primary | ICD-10-CM

## 2019-12-23 DIAGNOSIS — Z85.038 PERSONAL HISTORY OF COLON CANCER: ICD-10-CM

## 2019-12-23 NOTE — PROGRESS NOTES
Subjective: She is tolerating diet and moving her bowels. Her weight is slightly up. Past medical history and ROS were reviewed and unchanged. abd soft, NTND    CEA 8/2019 is 0.8  CT C/A/P 9/2019 negative  MRI abdomen 4/18 possible IPMN pancreas, rec repeat in 1 year, 4/2019 no change  Colonoscopy 10/2018 by kandi Orr, 3 year recall      Assessment / Plan  Status post robotic sigmoid colectomy for T2N0 adenocarcinoma of the sigmoid colon, 2/18, ARCENIO  Repeat CEA  F/U 4 months    A total of 15 minutes was spent with the patient, with >50% of time spent on counseling and coordination of care. The diagnoses and plan were discussed with patient. All questions answered. Plan of care agreed to by all concerned.

## 2019-12-27 ENCOUNTER — HOSPITAL ENCOUNTER (OUTPATIENT)
Dept: LAB | Age: 59
Discharge: HOME | End: 2019-12-27
Payer: COMMERCIAL

## 2019-12-27 DIAGNOSIS — Z85.038 PERSONAL HISTORY OF COLON CANCER: ICD-10-CM

## 2019-12-27 PROCEDURE — 82378 CARCINOEMBRYONIC ANTIGEN: CPT

## 2019-12-27 PROCEDURE — 36415 COLL VENOUS BLD VENIPUNCTURE: CPT

## 2019-12-28 LAB — CEA SERPL-MCNC: 1 NG/ML

## 2020-06-29 ENCOUNTER — OFFICE VISIT (OUTPATIENT)
Dept: SURGERY | Age: 60
End: 2020-06-29

## 2020-06-29 ENCOUNTER — HOSPITAL ENCOUNTER (OUTPATIENT)
Dept: LAB | Age: 60
Discharge: HOME | End: 2020-06-29
Payer: COMMERCIAL

## 2020-06-29 VITALS
OXYGEN SATURATION: 96 % | TEMPERATURE: 97.9 F | HEIGHT: 60 IN | RESPIRATION RATE: 17 BRPM | SYSTOLIC BLOOD PRESSURE: 133 MMHG | WEIGHT: 196 LBS | HEART RATE: 78 BPM | BODY MASS INDEX: 38.48 KG/M2 | DIASTOLIC BLOOD PRESSURE: 76 MMHG

## 2020-06-29 DIAGNOSIS — Z08 ENCOUNTER FOR ROUTINE CANCER FOLLOW-UP: ICD-10-CM

## 2020-06-29 DIAGNOSIS — Z85.038 PERSONAL HISTORY OF COLON CANCER: Primary | ICD-10-CM

## 2020-06-29 LAB — CEA SERPL-MCNC: 1 NG/ML

## 2020-06-29 PROCEDURE — 82378 CARCINOEMBRYONIC ANTIGEN: CPT

## 2020-06-29 PROCEDURE — 36415 COLL VENOUS BLD VENIPUNCTURE: CPT

## 2020-06-29 NOTE — PROGRESS NOTES
Subjective: Tolerating and diet and moving her bowels. Weight is stable. Past medical history and ROS were reviewed and unchanged. Abdomen: Soft, nontender nondistended    CIS 339109 is 1.0  CT C/A/P 9/2019 negative  MRI abdomen 4/18 possible IPMN pancreas, rec repeat in 1 year, 4/2019 no change  Colonoscopy 10/2018 by kandi Orr, 3 year recall      Assessment / Plan  Status post robotic sigmoid colectomy for T2N0 adenocarcinoma of the sigmoid colon, 2/18, ARCENIO  Repeat CEA  F/U 6 months    A total of 15 minutes was spent with the patient, with >50% of time spent on counseling and coordination of care. The diagnoses and plan were discussed with patient. All questions answered. Plan of care agreed to by all concerned.

## 2021-01-11 ENCOUNTER — HOSPITAL ENCOUNTER (OUTPATIENT)
Dept: LAB | Age: 61
Discharge: HOME | End: 2021-01-11
Payer: COMMERCIAL

## 2021-01-11 ENCOUNTER — OFFICE VISIT (OUTPATIENT)
Dept: SURGERY | Age: 61
End: 2021-01-11
Payer: COMMERCIAL

## 2021-01-11 VITALS
SYSTOLIC BLOOD PRESSURE: 143 MMHG | TEMPERATURE: 98 F | HEART RATE: 76 BPM | HEIGHT: 60 IN | OXYGEN SATURATION: 98 % | RESPIRATION RATE: 18 BRPM | BODY MASS INDEX: 40.05 KG/M2 | WEIGHT: 204 LBS | DIASTOLIC BLOOD PRESSURE: 75 MMHG

## 2021-01-11 DIAGNOSIS — Z08 ENCOUNTER FOR ROUTINE CANCER FOLLOW-UP: Primary | ICD-10-CM

## 2021-01-11 DIAGNOSIS — Z85.038 PERSONAL HISTORY OF COLON CANCER: ICD-10-CM

## 2021-01-11 PROCEDURE — 99213 OFFICE O/P EST LOW 20 MIN: CPT | Performed by: COLON & RECTAL SURGERY

## 2021-01-11 PROCEDURE — 36415 COLL VENOUS BLD VENIPUNCTURE: CPT

## 2021-01-11 PROCEDURE — 82378 CARCINOEMBRYONIC ANTIGEN: CPT

## 2021-01-11 RX ORDER — CALCIUM CARBONATE/VITAMIN D3 600 MG-20
TABLET,CHEWABLE ORAL
COMMUNITY

## 2021-01-11 NOTE — PROGRESS NOTES
Subjective: Tolerating a diet. Weight is stable. Past medical history and ROS were reviewed and unchanged. Abdomen: Soft, nontender nondistended  Wounds healed, no hernias    CEA 6/2020 is 1.0  CT C/A/P 9/2019 negative  MRI abdomen 4/18 possible IPMN pancreas, rec repeat in 1 year, 4/2019 no change  Colonoscopy 10/2018 by kandi Orr, 3 year recall      Assessment / Plan  Status post robotic sigmoid colectomy for T2N0 adenocarcinoma of the sigmoid colon, 2/18, ARCENIO  Repeat CEA  Check CTs  F/U 6 months    20 minutes was spent in patient care. The diagnoses and plan were discussed with patient. All questions answered. Plan of care agreed to by all concerned.

## 2021-01-12 LAB — CEA SERPL-MCNC: 1 NG/ML

## 2021-01-21 ENCOUNTER — HOSPITAL ENCOUNTER (OUTPATIENT)
Dept: CT IMAGING | Age: 61
Discharge: HOME | End: 2021-01-21
Attending: COLON & RECTAL SURGERY
Payer: COMMERCIAL

## 2021-01-21 DIAGNOSIS — Z85.038 PERSONAL HISTORY OF COLON CANCER: ICD-10-CM

## 2021-01-21 LAB — CREAT UR-MCNC: 0.8 MG/DL (ref 0.6–1.3)

## 2021-01-21 PROCEDURE — 74011000636 HC RX REV CODE- 636: Performed by: COLON & RECTAL SURGERY

## 2021-01-21 PROCEDURE — 74177 CT ABD & PELVIS W/CONTRAST: CPT

## 2021-01-21 PROCEDURE — 82565 ASSAY OF CREATININE: CPT

## 2021-01-21 RX ADMIN — IOPAMIDOL 100 ML: 612 INJECTION, SOLUTION INTRAVENOUS at 08:36

## 2021-01-25 ENCOUNTER — TELEPHONE (OUTPATIENT)
Dept: SURGERY | Age: 61
End: 2021-01-25

## 2021-01-25 NOTE — TELEPHONE ENCOUNTER
----- Message from Selvin Garrido MD sent at 1/21/2021 10:34 AM EST -----  Call patient and tell them their CT's are normal.    Notified patient of CT results. Patient understands.

## 2021-07-29 ENCOUNTER — HOSPITAL ENCOUNTER (OUTPATIENT)
Dept: LAB | Age: 61
Discharge: HOME | End: 2021-07-29
Payer: COMMERCIAL

## 2021-07-29 ENCOUNTER — OFFICE VISIT (OUTPATIENT)
Dept: SURGERY | Age: 61
End: 2021-07-29
Payer: COMMERCIAL

## 2021-07-29 VITALS
HEART RATE: 80 BPM | HEIGHT: 60 IN | OXYGEN SATURATION: 97 % | DIASTOLIC BLOOD PRESSURE: 78 MMHG | BODY MASS INDEX: 39.07 KG/M2 | TEMPERATURE: 98 F | WEIGHT: 199 LBS | SYSTOLIC BLOOD PRESSURE: 138 MMHG | RESPIRATION RATE: 18 BRPM

## 2021-07-29 DIAGNOSIS — Z85.038 PERSONAL HISTORY OF COLON CANCER: ICD-10-CM

## 2021-07-29 DIAGNOSIS — Z08 ENCOUNTER FOR ROUTINE CANCER FOLLOW-UP: Primary | ICD-10-CM

## 2021-07-29 LAB — CEA SERPL-MCNC: 0.9 NG/ML

## 2021-07-29 PROCEDURE — 36415 COLL VENOUS BLD VENIPUNCTURE: CPT

## 2021-07-29 PROCEDURE — 99213 OFFICE O/P EST LOW 20 MIN: CPT | Performed by: COLON & RECTAL SURGERY

## 2021-07-29 PROCEDURE — 82378 CARCINOEMBRYONIC ANTIGEN: CPT

## 2021-07-29 NOTE — PROGRESS NOTES
Subjective: Tolerating diet. Bowels moving normally. Weight stable. Past medical history and ROS were reviewed and unchanged. Abdomen: Soft, nontender nondistended  Wounds healed, no hernias    CEA 1/2021 is 1.0  CT C/A/P 1/2021 negative  MRI abdomen 4/18 possible IPMN pancreas, rec repeat in 1 year, 4/2019 no change  Colonoscopy 10/2018 by Dominga, polyps, 3 year recall      Assessment / Plan  Status post robotic sigmoid colectomy for T2N0 adenocarcinoma of the sigmoid colon, 2/18, ARCENIO  Repeat CEA  Told patient she should have a colonoscopy with Dominga  F/U 6 months    20 minutes was spent in patient care. The diagnoses and plan were discussed with patient. All questions answered. Plan of care agreed to by all concerned.

## 2021-07-29 NOTE — PROGRESS NOTES
Chief Complaint   Patient presents with    Follow-up     Status post robotic sigmoid colectomy for T2N0 adenocarcinoma of the sigmoid colon,    1. Have you been to the ER, urgent care clinic since your last visit? Hospitalized since your last visit? No    2. Have you seen or consulted any other health care providers outside of the [address] [place] [address] since your last visit? Include any pap smears or colon screening.  Yes pcp and rheumatology

## 2022-02-10 ENCOUNTER — OFFICE VISIT (OUTPATIENT)
Dept: SURGERY | Age: 62
End: 2022-02-10
Payer: COMMERCIAL

## 2022-02-10 ENCOUNTER — HOSPITAL ENCOUNTER (OUTPATIENT)
Dept: LAB | Age: 62
Discharge: HOME | End: 2022-02-10
Payer: COMMERCIAL

## 2022-02-10 VITALS
DIASTOLIC BLOOD PRESSURE: 69 MMHG | RESPIRATION RATE: 17 BRPM | HEIGHT: 61 IN | OXYGEN SATURATION: 97 % | TEMPERATURE: 97.9 F | HEART RATE: 73 BPM | SYSTOLIC BLOOD PRESSURE: 112 MMHG | WEIGHT: 189 LBS | BODY MASS INDEX: 35.68 KG/M2

## 2022-02-10 DIAGNOSIS — Z85.038 PERSONAL HISTORY OF COLON CANCER: ICD-10-CM

## 2022-02-10 DIAGNOSIS — Z08 ENCOUNTER FOR ROUTINE CANCER FOLLOW-UP: Primary | ICD-10-CM

## 2022-02-10 PROCEDURE — 99213 OFFICE O/P EST LOW 20 MIN: CPT | Performed by: COLON & RECTAL SURGERY

## 2022-02-10 PROCEDURE — 82378 CARCINOEMBRYONIC ANTIGEN: CPT

## 2022-02-10 PROCEDURE — 36415 COLL VENOUS BLD VENIPUNCTURE: CPT

## 2022-02-10 RX ORDER — METFORMIN HYDROCHLORIDE 500 MG/1
1000 TABLET, EXTENDED RELEASE ORAL
COMMUNITY
Start: 2022-01-24 | End: 2022-02-10 | Stop reason: CLARIF

## 2022-02-10 RX ORDER — ALPHA LIPOIC ACID 300 MG
CAPSULE ORAL
COMMUNITY

## 2022-02-10 RX ORDER — METFORMIN HYDROCHLORIDE 500 MG/1
TABLET, EXTENDED RELEASE ORAL
COMMUNITY
Start: 2022-01-24

## 2022-02-10 NOTE — PROGRESS NOTES
Subjective: Tolerating diet and moving her bowels. Has lost a little weight. Started Metformin. Past medical history and ROS were reviewed and unchanged. Abdomen: Soft, nontender nondistended  Wounds healed, no hernia    CEA 7/2021 is 0.9  CT C/A/P 1/2021 negative  MRI abdomen 4/18 possible IPMN pancreas, rec repeat in 1 year, 4/2019 no change  Colonoscopy 12/21 by Dominga, kandi, 1 year recall per patient due to flat polyps      Assessment / Plan  Status post robotic sigmoid colectomy for T2N0 adenocarcinoma of the sigmoid colon, 2/18, ARCENIO  CT C/A/P  Repeat CEA  F/U 1 year    20 minutes was spent in patient care. The diagnoses and plan were discussed with patient. All questions answered. Plan of care agreed to by all concerned.

## 2022-02-11 LAB — CEA SERPL-MCNC: 0.8 NG/ML

## 2022-02-24 ENCOUNTER — HOSPITAL ENCOUNTER (OUTPATIENT)
Dept: CT IMAGING | Age: 62
Discharge: HOME | End: 2022-02-24
Attending: COLON & RECTAL SURGERY
Payer: COMMERCIAL

## 2022-02-24 DIAGNOSIS — Z85.038 PERSONAL HISTORY OF COLON CANCER: ICD-10-CM

## 2022-02-24 PROCEDURE — 74011000636 HC RX REV CODE- 636: Performed by: COLON & RECTAL SURGERY

## 2022-02-24 PROCEDURE — 74177 CT ABD & PELVIS W/CONTRAST: CPT

## 2022-02-24 PROCEDURE — 82565 ASSAY OF CREATININE: CPT

## 2022-02-24 RX ADMIN — IOPAMIDOL 100 ML: 612 INJECTION, SOLUTION INTRAVENOUS at 08:00

## 2022-02-25 LAB — CREAT UR-MCNC: 0.6 MG/DL (ref 0.6–1.3)

## 2022-03-07 ENCOUNTER — DOCUMENTATION ONLY (OUTPATIENT)
Dept: SURGERY | Age: 62
End: 2022-03-07

## 2022-03-07 DIAGNOSIS — Z85.038 HISTORY OF COLON CANCER: Primary | ICD-10-CM

## 2022-03-07 DIAGNOSIS — K76.9 LIVER LESION: ICD-10-CM

## 2022-03-07 DIAGNOSIS — K86.9 PANCREATIC LESION: ICD-10-CM

## 2022-03-07 NOTE — PROGRESS NOTES
CT of chest abdomen pelvis shows liver lesion. This was present on prior CT but not prior MRI. MRI recommended to confirm this assessed. Patient also has a history of pancreatic cyst.  Will order MRI to reevaluate both of these. Discussed with patient.

## 2022-03-15 ENCOUNTER — HOSPITAL ENCOUNTER (OUTPATIENT)
Age: 62
Discharge: HOME | End: 2022-03-15
Attending: COLON & RECTAL SURGERY
Payer: COMMERCIAL

## 2022-03-15 VITALS — WEIGHT: 190 LBS | BODY MASS INDEX: 35.9 KG/M2

## 2022-03-15 DIAGNOSIS — K76.9 LIVER LESION: ICD-10-CM

## 2022-03-15 DIAGNOSIS — K86.9 PANCREATIC LESION: ICD-10-CM

## 2022-03-15 DIAGNOSIS — Z85.038 HISTORY OF COLON CANCER: ICD-10-CM

## 2022-03-15 PROCEDURE — A9577 INJ MULTIHANCE: HCPCS | Performed by: COLON & RECTAL SURGERY

## 2022-03-15 PROCEDURE — 74183 MRI ABD W/O CNTR FLWD CNTR: CPT

## 2022-03-15 PROCEDURE — 74011250636 HC RX REV CODE- 250/636: Performed by: COLON & RECTAL SURGERY

## 2022-03-15 RX ADMIN — GADOBENATE DIMEGLUMINE 20 ML: 529 INJECTION, SOLUTION INTRAVENOUS at 16:13

## 2022-06-23 NOTE — LETTER
2/8/2018 9:26 AM 
 
Patient:  Favio Orozco YOB: 1960 Date of Visit: 2/8/2018 Charlette Damian MD 
[address] [place] [address] [address] [facility] [address] [address] [address] VIA Facsimile: [phone] Yomaira Brar MD 
[address]  [address] [place] [id number] VIA Facsimile: [phone] Dear Leo Raines saw Marysol York in the office today for her newly diagnosed descending colon cancer. She does describe rectal bleeding over the course of the past year, and her first colonoscopy was with you just recently. You identified a malignant appearing polyp at 40 cm and this was tattooed proximally and distally. Pathology did confirm invasive adenocarcinoma. The rest of her polyps, including the rectal polyp were benign. We will obtain staging CT imaging and a CEA level and bring her to the operating room for a robotic left colectomy. She is very eager to proceed. Thank you very much for your referral of Ms. Favio Orozco. If you have questions, please do not hesitate to call me. I look forward to following Ms. Lennon along with you and will keep you updated as to her progress. Sincerely, Patty Foote MD 
 
 No

## (undated) DEVICE — PURSE STRING DEVICE: Brand: PURSTRING

## (undated) DEVICE — STERILE POLYISOPRENE POWDER-FREE SURGICAL GLOVES: Brand: PROTEXIS

## (undated) DEVICE — 3M™ DURAPORE™ SURGICAL TAPE 1538-3, 3 INCH X 10 YARD (7,5CM X 9,1M), 4 ROLLS/BOX: Brand: 3M™ DURAPORE™

## (undated) DEVICE — TUBING IRRIG PRESSURIZED BG SET SPIK PRB + II

## (undated) DEVICE — PACK PROCEDURE SURG MAJ W/ BASIN LF

## (undated) DEVICE — TIP COVER ACCESSORY

## (undated) DEVICE — SOL ANTI-FOG 6ML MEDC -- MEDICHOICE - CONVERT TO 358427

## (undated) DEVICE — DRAPE,UNDERBUTTOCKS,PCH,STERILE: Brand: MEDLINE

## (undated) DEVICE — KENDALL SCD EXPRESS SLEEVES, KNEE LENGTH, MEDIUM: Brand: KENDALL SCD

## (undated) DEVICE — 40580 - THE PINK PAD - ADVANCED TRENDELENBURG POSITIONING KIT: Brand: 40580 - THE PINK PAD - ADVANCED TRENDELENBURG POSITIONING KIT

## (undated) DEVICE — INTENDED FOR TISSUE SEPARATION, AND OTHER PROCEDURES THAT REQUIRE A SHARP SURGICAL BLADE TO PUNCTURE OR CUT.: Brand: BARD-PARKER ®  SAFETY SCALPED

## (undated) DEVICE — SEAL UNIV 5-8MM DISP BX/10 -- DA VINCI XI - SNGL USE

## (undated) DEVICE — SUTURE MCRYL SZ 4-0 L18IN ABSRB UD L19MM PS-2 3/8 CIR PRIM Y496G

## (undated) DEVICE — NEEDLE INSUF L120MM DIA2MM DISP FOR PNEUMOPERI ENDOPATH

## (undated) DEVICE — ELECTRO LUBE IS A SINGLE PATIENT USE DEVICE THAT IS INTENDED TO BE USED ON ELECTROSURGICAL ELECTRODES TO REDUCE STICKING.: Brand: KEY SURGICAL ELECTRO LUBE

## (undated) DEVICE — TRAY CATH OD16FR SIL URIN M STATLOK STBL DEV SURSTP

## (undated) DEVICE — SUTURE VCRL SZ 3-0 L27IN ABSRB VLT L26MM SH 1/2 CIR J316H

## (undated) DEVICE — 3M™ STERI-DRAPE™ INSTRUMENT POUCH 1018L: Brand: STERI-DRAPE™

## (undated) DEVICE — SUTURE SZ 0 27IN 5/8 CIR UR-6  TAPER PT VIOLET ABSRB VICRYL J603H

## (undated) DEVICE — SUTURE VCRL SZ 0 L18IN ABSRB UD POLYGLACTIN 910 BRAID TIE J912G

## (undated) DEVICE — SUTURE PDS II SZ 1 L36IN ABSRB VLT CTXB L48MM 1/2 CIR BLNT ZB371

## (undated) DEVICE — REM POLYHESIVE ADULT PATIENT RETURN ELECTRODE: Brand: VALLEYLAB

## (undated) DEVICE — SOFT SILICONE HYDROCELLULAR SACRUM DRESSING WITH LOCK AWAY LAYER: Brand: ALLEVYN LIFE SACRUM (LARGE) PACK OF 10

## (undated) DEVICE — STAPLER 45 RELOAD BLUE: Brand: ENDOWRIST

## (undated) DEVICE — LAPAROSCOPIC ACCESS SYSTEM: Brand: ALEXIS LAPAROSCOPIC SYSTEM WITH KII FIOS FIRST ENTRY

## (undated) DEVICE — VESSEL SEALER: Brand: ENDOWRIST

## (undated) DEVICE — SUTURE VCRL SZ 0 L36IN ABSRB UD L36MM CT-1 1/2 CIR J946H

## (undated) DEVICE — (D)PACK ICE DISP -- DISC BY MFR

## (undated) DEVICE — REDUCER CANN ENDOWRIST 12-8MM -- DA VINCI XI - SNGL USE

## (undated) DEVICE — STAPLER INT L28CM DIA29MM CLS STPL H10-2.5MM OPN LEG L5.5MM

## (undated) DEVICE — DRESSING,GAUZE,XEROFORM,CURAD,1"X8",ST: Brand: CURAD

## (undated) DEVICE — SOLUTION IV 1000ML 0.9% SOD CHL

## (undated) DEVICE — BLADELESS OBTURATOR

## (undated) DEVICE — ENDOCUT SCISSOR TIP, DISPOSABLE: Brand: RENEW

## (undated) DEVICE — COLUMN DRAPE

## (undated) DEVICE — COVER LT HNDL BLU STRL -- MEDICHOICE

## (undated) DEVICE — ARM DRAPE

## (undated) DEVICE — FCPS ENDOSCP 5MMX33CM -- ENDOPATH

## (undated) DEVICE — SEAL

## (undated) DEVICE — STAPLER SHEATH: Brand: ENDOWRIST

## (undated) DEVICE — Device

## (undated) DEVICE — SUCTION IRRIGATOR: Brand: ENDOWRIST

## (undated) DEVICE — SUT PROL 2-0 30IN SH BLU --